# Patient Record
Sex: FEMALE | Race: WHITE | Employment: FULL TIME | URBAN - METROPOLITAN AREA
[De-identification: names, ages, dates, MRNs, and addresses within clinical notes are randomized per-mention and may not be internally consistent; named-entity substitution may affect disease eponyms.]

---

## 2017-02-23 ENCOUNTER — OFFICE VISIT (OUTPATIENT)
Dept: DERMATOLOGY | Facility: CLINIC | Age: 42
End: 2017-02-23

## 2017-02-23 DIAGNOSIS — L57.8 DIFFUSE PHOTODAMAGE OF SKIN: ICD-10-CM

## 2017-02-23 DIAGNOSIS — D22.9 MULTIPLE BENIGN NEVI: Primary | ICD-10-CM

## 2017-02-23 DIAGNOSIS — L81.4 SOLAR LENTIGINOSIS: ICD-10-CM

## 2017-02-23 ASSESSMENT — PAIN SCALES - GENERAL: PAINLEVEL: NO PAIN (0)

## 2017-02-23 NOTE — MR AVS SNAPSHOT
After Visit Summary   2/23/2017    [facility]    MRN: [id number]           Patient Information     Date Of Birth          1975        Visit Information        Provider Department      2/23/2017 8:30 AM Rachel Kimbrough MD [facility] Dermatology        Today's Diagnoses     Multiple benign nevi    -  1    Solar lentiginosis        Diffuse photodamage of skin           Follow-ups after your visit        Who to contact     Please call your clinic at [phone] to:    Ask questions about your health    Make or cancel appointments    Discuss your medicines    Learn about your test results    Speak to your doctor   If you have compliments or concerns about an experience at your clinic, or if you wish to file a complaint, please contact [facility] Patient Relations at [phone] or email us at [email or web address]         Additional Information About Your Visit        MyChart Information     [employer] gives you secure access to your electronic health record. If you see a primary care provider, you can also send messages to your care team and make appointments. If you have questions, please call your primary care clinic.  If you do not have a primary care provider, please call [phone] and they will assist you.      [employer] is an electronic gateway that provides easy, online access to your medical records. With [employer], you can request a clinic appointment, read your test results, renew a prescription or communicate with your care team.     To access your existing account, please contact your [facility] or call [phone] for assistance.        Care EveryWhere ID     This is your Care EveryWhere ID. This could be used by other organizations to access your [place] medical records  [id number]         Blood Pressure from Last 3 Encounters:   12/19/16 124/86   11/10/16 126/80   10/03/16 126/84    Weight from Last 3 Encounters:    12/19/16 64 kg (141 lb)   11/10/16 63.5 kg (140 lb)   10/03/16 64.9 kg (143 lb)              Today, you had the following     No orders found for display       Primary Care Provider Office Phone # Fax #    Belgica Vogel [phone]       [facility] [address] [facility] [address]        Thank you!     Thank you for choosing [facility]  for your care. Our goal is always to provide you with excellent care. Hearing back from our patients is one way we can continue to improve our services. Please take a few minutes to complete the written survey that you may receive in the mail after your visit with us. Thank you!             Your Updated Medication List - Protect others around you: Learn how to safely use, store and throw away your medicines at www.disposemymeds.org.          This list is accurate as of: 2/23/17 11:59 PM.  Always use your most recent med list.                   Brand Name Dispense Instructions for use    ALPRAZolam 0.25 MG tablet    XANAX    20 tablet    Take 1 tablet (0.25 mg) by mouth 3 times daily as needed for anxiety       escitalopram 10 MG tablet    LEXAPRO    90 tablet    Take 1 tablet (10 mg) by mouth daily       losartan 100 MG tablet    COZAAR    90 tablet    Take 1 tablet (100 mg) by mouth daily

## 2017-02-23 NOTE — LETTER
2/23/2017       RE: Dorie Armendariz  [address] [facility] [address]     Dear Colleague,    Thank you for referring your patient, Dorie Armendariz, to the [facility] DERMATOLOGY at [facility]. Please see a copy of my visit note below.            Pictures were placed in Pt's chart today for future reference.    [facility] Dermatology Note  Encounter Date: Feb 23, 2017    CC:   Chief Complaint   Patient presents with     Derm Problem     Dorie is here today for a full body mole check. She has two moles she would like checked out one her her upper back, and left breast.     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 Patient states that she has one on her upper back that other people have pointed out to her, and that she also has one on her left chest that has been there for years and seems to have slowly increased in size and become more of a triangular shape. She also has a mole on her right thigh that has been there since childhood, but has not changed. She otherwise denies any new, changing, or symptomatic lesions.        She is diligent about sun protection, and has just had a few blistering sun burns as a child. Never used tanning beds.      Past Medical History:   Past Medical History   Diagnosis Date     Anxiety      citalopram in the past, minimal xanax use      Hypertension goal BP (blood pressure) < 140/90      IBS (irritable bowel syndrome)      Migraine      Ovarian cyst      Uterine fibroid      Viral hepatitis 4/2013     Past Surgical History:  Past Surgical History   Procedure Laterality Date     Laparotomy exploratory  1988     ruptured appendix     Appendectomy open  1988     Laparoscopic hysterectomy total  6/2013     and ovarian cystectomy, ovaries intact     Social History:  The patient works as a  real estate researcher.     Family History:  Family History   Problem Relation Age of Onset     Hypertension Mother      Hypertension Father      Prostate Cancer Father      Lipids Father      Coronary Artery Disease Father 79     CAD, double bypass     Hypertension Brother      Hypertension Sister      Medications:  Current Outpatient Prescriptions   Medication Sig Dispense Refill     escitalopram (LEXAPRO) 10 MG tablet Take 1 tablet (10 mg) by mouth daily 90 tablet 1     losartan (COZAAR) 100 MG tablet Take 1 tablet (100 mg) by mouth daily 90 tablet 3     ALPRAZolam (XANAX) 0.25 MG tablet Take 1 tablet (0.25 mg) by mouth 3 times daily as needed for anxiety 20 tablet 0     Allergies:  Allergies   Allergen Reactions     Sulfa Drugs Other (See Comments)     Joint pain     Review of Systems:  Skin/Heme New Pt: The patient denies frequent sun exposure. The patient denies excessive scarring or problems healing except as per HPI. The patient denies excessive bleeding.  Constitutional: The patient denies fatigue, fevers, chills, unintended weight loss, and night sweats.    Physical exam:  - This is a well developed, well-nourished female in no acute distress, in a pleasant mood.    - Schwartz Skin Type I-II  - Full skin, which includes the head/face, both arms, chest, back, abdomen,both legs, genitalia and/or groin buttocks, digits and/or nails, was examined.  - Multiple regular brown and some pink pigmented macules and papules are identified on the trunk and extremities.  - Most prominent lesions on the right thigh (round measuring ~ 8mm) and left hip (oblong, measuring ~ 8mm).   - Scattered brown macules on sun exposed areas of the cheeks and nose.  - Pink papule on the right nasal dorsum without surrounding telangiectasias    Impression/Plan:  1. Multiple Clinically Benign Nevi: Few with congenital appearance as above on the right thigh and left hip.     Discussed with patient that all of these lesions are evenly  pigmented with regular borders. Discussed the ABCDEs of melanoma.     Recommended use of a broad spectrum sunscreen of at least SPF 30 on all sun exposed sites daily.  Apply 20 minutes prior to exposure and repeat application every two hours or after sweating or swimming.  Avoid any intentional indoor or outdoor tanning.      2. Fibrous Papule of the nose:     Discussed the benign nature of these lesions. No need for additional treatment.     3. Solar Lentigines:     Discussed that these are related to sun exposure and discussed sun protection as detailed above.     CC Belgica Kee MD  [facility]  [address] [place] [address] on close of this encounter.  Follow-up in 1 year, earlier for new or changing lesions.     Rachel Kimbrough MD  PGY-3 Internal Medicine/ Dermatology  [phone]    Carmen Onofre staffed the patient.    Staff Involved:  Resident(Rachel Kimbrough)/Staff(as above)      Patient was seen and examined with the medicine/dermatology resident. I agree with the history, review of systems, physical examination, assessments and plan.    Carmen Mckeon MD  Professor and  Chair  Department of Dermatology  [facility]

## 2017-02-23 NOTE — NURSING NOTE
Chief Complaint   Patient presents with     Derm Problem     Dorie is here today for a full body mole check. She has two moles she would like checked out one her her upper back, and left breast.     Maurice Burnette LPN

## 2017-02-23 NOTE — PROGRESS NOTES
[facility] Dermatology Note  Encounter Date: Feb 23, 2017    CC:   Chief Complaint   Patient presents with     Derm Problem     Dorie is here today for a full body mole check. She has two moles she would like checked out one her her upper back, and left breast.     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 Patient states that she has one on her upper back that other people have pointed out to her, and that she also has one on her left chest that has been there for years and seems to have slowly increased in size and become more of a triangular shape. She also has a mole on her right thigh that has been there since childhood, but has not changed. She otherwise denies any new, changing, or symptomatic lesions.        She is diligent about sun protection, and has just had a few blistering sun burns as a child. Never used tanning beds.      Past Medical History:   Past Medical History   Diagnosis Date     Anxiety      citalopram in the past, minimal xanax use      Hypertension goal BP (blood pressure) < 140/90      IBS (irritable bowel syndrome)      Migraine      Ovarian cyst      Uterine fibroid      Viral hepatitis 4/2013     Past Surgical History:  Past Surgical History   Procedure Laterality Date     Laparotomy exploratory  1988     ruptured appendix     Appendectomy open  1988     Laparoscopic hysterectomy total  6/2013     and ovarian cystectomy, ovaries intact     Social History:  The patient works as a real estate researcher.     Family History:  Family History   Problem Relation Age of Onset     Hypertension Mother      Hypertension Father      Prostate Cancer Father      Lipids Father      Coronary Artery Disease Father 79     CAD, double bypass     Hypertension Brother      Hypertension Sister      Medications:  Current Outpatient  Prescriptions   Medication Sig Dispense Refill     escitalopram (LEXAPRO) 10 MG tablet Take 1 tablet (10 mg) by mouth daily 90 tablet 1     losartan (COZAAR) 100 MG tablet Take 1 tablet (100 mg) by mouth daily 90 tablet 3     ALPRAZolam (XANAX) 0.25 MG tablet Take 1 tablet (0.25 mg) by mouth 3 times daily as needed for anxiety 20 tablet 0     Allergies:  Allergies   Allergen Reactions     Sulfa Drugs Other (See Comments)     Joint pain     Review of Systems:  Skin/Heme New Pt: The patient denies frequent sun exposure. The patient denies excessive scarring or problems healing except as per HPI. The patient denies excessive bleeding.  Constitutional: The patient denies fatigue, fevers, chills, unintended weight loss, and night sweats.    Physical exam:  - This is a well developed, well-nourished female in no acute distress, in a pleasant mood.    - Schwartz Skin Type I-II  - Full skin, which includes the head/face, both arms, chest, back, abdomen,both legs, genitalia and/or groin buttocks, digits and/or nails, was examined.  - Multiple regular brown and some pink pigmented macules and papules are identified on the trunk and extremities.  - Most prominent lesions on the right thigh (round measuring ~ 8mm) and left hip (oblong, measuring ~ 8mm).   - Scattered brown macules on sun exposed areas of the cheeks and nose.  - Pink papule on the right nasal dorsum without surrounding telangiectasias    Impression/Plan:  1. Multiple Clinically Benign Nevi: Few with congenital appearance as above on the right thigh and left hip.     Discussed with patient that all of these lesions are evenly pigmented with regular borders. Discussed the ABCDEs of melanoma.     Recommended use of a broad spectrum sunscreen of at least SPF 30 on all sun exposed sites daily.  Apply 20 minutes prior to exposure and repeat application every two hours or after sweating or swimming.  Avoid any intentional indoor or outdoor tanning.      2. Fibrous  Papule of the nose:     Discussed the benign nature of these lesions. No need for additional treatment.     3. Solar Lentigines:     Discussed that these are related to sun exposure and discussed sun protection as detailed above.     CC Belgica Kee MD  [facility]  [address] [place] [address] on close of this encounter.  Follow-up in 1 year, earlier for new or changing lesions.     Rachel Kimbrough MD  PGY-3 Internal Medicine/ Dermatology  [phone]    Carmen Onofre staffed the patient.    Staff Involved:  Resident(Rachel Kimbrough)/Staff(as above)      Patient was seen and examined with the medicine/dermatology resident. I agree with the history, review of systems, physical examination, assessments and plan.    Carmen Mckeon MD  Professor and  Chair  Department of Dermatology  [facility]

## 2017-03-12 PROBLEM — L57.8 DIFFUSE PHOTODAMAGE OF SKIN: Status: ACTIVE | Noted: 2017-03-12

## 2017-04-23 NOTE — PROGRESS NOTES
SUBJECTIVE:                                                    Dorie Armendariz is a 41 year old female who presents to clinic today for the following health issues:      Hypertension Follow-up      Outpatient blood pressures are not being checked.    Low Salt Diet: no added salt     Anxiety and Depression Follow-Up    Status since last visit: Improved     Other associated symptoms:None    Complicating factors:   Significant life event: No   Current substance abuse: None    Uses xanax rarely - #20 have lasted a year.    DEVYN-7 SCORE 11/10/2016 12/19/2016 4/24/2017   Total Score 10 0 1        PHQ-9 SCORE 11/10/2016 12/19/2016 4/24/2017   Total Score 12 1 1       GAD7         Amount of exercise or physical activity: 2-3 days/week for an average of 30-45 minutes    Problems taking medications regularly: No    Medication side effects: low libido on lexapro    Diet: low salt        Problem list and histories reviewed & adjusted, as indicated.    Additional history: as documented    Patient Active Problem List   Diagnosis     Hypertension goal BP (blood pressure) < 140/90     Anxiety     Major depression     Diffuse photodamage of skin     Past Surgical History:   Procedure Laterality Date     APPENDECTOMY OPEN  1988     LAPAROSCOPIC HYSTERECTOMY TOTAL  6/2013    and ovarian cystectomy, ovaries intact     LAPAROTOMY EXPLORATORY  1988    ruptured appendix       Social History   Substance Use Topics     Smoking status: Never Smoker     Smokeless tobacco: Never Used     Alcohol use Yes      Comment: social     Family History   Problem Relation Age of Onset     Hypertension Mother      Hypertension Father      Prostate Cancer Father      Lipids Father      Coronary Artery Disease Father 79     CAD, double bypass     Hypertension Brother      Hypertension Sister          BP Readings from Last 3 Encounters:   04/24/17 128/84   12/19/16 124/86   11/10/16 126/80    Wt Readings from Last 3 Encounters:   04/24/17 150 lb 8 oz (68.3 kg)  "  12/19/16 141 lb (64 kg)   11/10/16 140 lb (63.5 kg)                 Reviewed and updated as needed this visit by clinical staff  Tobacco  Allergies  Meds  Problems  Med Hx  Surg Hx  Fam Hx  Soc Hx        Reviewed and updated as needed this visit by Provider  Allergies  Meds  Problems         ROS:  CONST: POSITIVE for night sweats, NEGATIVE for fevers  RESP:  NEGATIVE for shortness of breath or cough  CV:  NEGATIVE for chest pain    GI: NEGATIVE for abdominal pain   :  NEGATIVE for vaginal dryness      OBJECTIVE:                                                    /84 (BP Location: Right arm, Patient Position: Chair, Cuff Size: Adult Regular)  Pulse 94  Temp 99.1  F (37.3  C) (Oral)  Resp 16  Ht 5' 5\" (1.651 m)  Wt 150 lb 8 oz (68.3 kg)  SpO2 98%  BMI 25.04 kg/m2  Body mass index is 25.04 kg/(m^2).  GEN:  no apparent distress  LUNGS:  normal respiratory effort, and lungs clear to auscultation bilaterally - no rales, rhonchi or wheezes  CV: regular rate and rhythm, normal S1 S2, no S3 or S4 and no murmur, click or rub   PSYCH:    Appearance: appropriately and casually dressed    Attitude: cooperative    Speech:  normal rate, rhythm, and tone    Thought Form: organized    Thought Content: no evidence of psychotic thought    Mood: better    Affect: mood congruent    Insight: good         ASSESSMENT/PLAN:                                                        1. Essential hypertension with goal blood pressure less than 140/90  stable/well controlled   - losartan (COZAAR) 100 MG tablet; Take 1 tablet (100 mg) by mouth daily  Dispense: 90 tablet; Refill: 3  - Basic metabolic panel    2. Anxiety  stable/well controlled   - escitalopram (LEXAPRO) 10 MG tablet; Take 1 tablet (10 mg) by mouth daily  Dispense: 90 tablet; Refill: 3  - ALPRAZolam (XANAX) 0.25 MG tablet; Take 1 tablet (0.25 mg) by mouth 3 times daily as needed for anxiety  Dispense: 20 tablet; Refill: 0    3. Major depressive disorder " with single episode, in full remission (H)  stable/well controlled but with SSRI side effect of decreased libido.  Suggested trial of lowering lexapro dose this summer if mood still well-controlled.  Then maintain 5 mg daily dose through winter (or increase back to 10 mg daily dose during winter).  Consider discontinuing SSRI early next sprin.  - escitalopram (LEXAPRO) 10 MG tablet; Take 1 tablet (10 mg) by mouth daily  Dispense: 90 tablet; Refill: 3      FUTURE APPOINTMENTS:       - Follow-up visit in 1 year.    Patient Instructions   If you are still feeling good in mid-summer (July) go ahead and try cutting the lexapro dose down to 5 mg (1/2 tab) daily and see how that goes.      Belgica Vogel MD  [facility]

## 2017-04-24 ENCOUNTER — OFFICE VISIT (OUTPATIENT)
Dept: FAMILY MEDICINE | Facility: CLINIC | Age: 42
End: 2017-04-24
Payer: COMMERCIAL

## 2017-04-24 VITALS
SYSTOLIC BLOOD PRESSURE: 128 MMHG | WEIGHT: 150.5 LBS | TEMPERATURE: 99.1 F | BODY MASS INDEX: 25.08 KG/M2 | RESPIRATION RATE: 16 BRPM | DIASTOLIC BLOOD PRESSURE: 84 MMHG | OXYGEN SATURATION: 98 % | HEART RATE: 94 BPM | HEIGHT: 65 IN

## 2017-04-24 DIAGNOSIS — F41.9 ANXIETY: ICD-10-CM

## 2017-04-24 DIAGNOSIS — I10 ESSENTIAL HYPERTENSION WITH GOAL BLOOD PRESSURE LESS THAN 140/90: ICD-10-CM

## 2017-04-24 DIAGNOSIS — F32.5 MAJOR DEPRESSIVE DISORDER WITH SINGLE EPISODE, IN FULL REMISSION (H): Primary | ICD-10-CM

## 2017-04-24 LAB
ANION GAP SERPL CALCULATED.3IONS-SCNC: 10 MMOL/L (ref 3–14)
BUN SERPL-MCNC: 14 MG/DL (ref 7–30)
CALCIUM SERPL-MCNC: 9.1 MG/DL (ref 8.5–10.1)
CHLORIDE SERPL-SCNC: 106 MMOL/L (ref 94–109)
CO2 SERPL-SCNC: 25 MMOL/L (ref 20–32)
CREAT SERPL-MCNC: 0.74 MG/DL (ref 0.52–1.04)
GFR SERPL CREATININE-BSD FRML MDRD: 87 ML/MIN/1.7M2
GLUCOSE SERPL-MCNC: 87 MG/DL (ref 70–99)
POTASSIUM SERPL-SCNC: 3.9 MMOL/L (ref 3.4–5.3)
SODIUM SERPL-SCNC: 141 MMOL/L (ref 133–144)

## 2017-04-24 PROCEDURE — 36415 COLL VENOUS BLD VENIPUNCTURE: CPT | Performed by: FAMILY MEDICINE

## 2017-04-24 PROCEDURE — 80048 BASIC METABOLIC PNL TOTAL CA: CPT | Performed by: FAMILY MEDICINE

## 2017-04-24 PROCEDURE — 99214 OFFICE O/P EST MOD 30 MIN: CPT | Performed by: FAMILY MEDICINE

## 2017-04-24 RX ORDER — ALPRAZOLAM 0.25 MG
0.25 TABLET ORAL 3 TIMES DAILY PRN
Qty: 20 TABLET | Refills: 0 | Status: SHIPPED | OUTPATIENT
Start: 2017-04-24 | End: 2018-01-29

## 2017-04-24 RX ORDER — ESCITALOPRAM OXALATE 10 MG/1
10 TABLET ORAL DAILY
Qty: 90 TABLET | Refills: 3 | Status: SHIPPED | OUTPATIENT
Start: 2017-04-24 | End: 2017-10-23

## 2017-04-24 RX ORDER — LOSARTAN POTASSIUM 100 MG/1
100 TABLET ORAL DAILY
Qty: 90 TABLET | Refills: 3 | Status: SHIPPED | OUTPATIENT
Start: 2017-04-24 | End: 2018-01-29

## 2017-04-24 ASSESSMENT — ANXIETY QUESTIONNAIRES
1. FEELING NERVOUS, ANXIOUS, OR ON EDGE: SEVERAL DAYS
5. BEING SO RESTLESS THAT IT IS HARD TO SIT STILL: NOT AT ALL
3. WORRYING TOO MUCH ABOUT DIFFERENT THINGS: NOT AT ALL
GAD7 TOTAL SCORE: 1
6. BECOMING EASILY ANNOYED OR IRRITABLE: NOT AT ALL
7. FEELING AFRAID AS IF SOMETHING AWFUL MIGHT HAPPEN: NOT AT ALL
2. NOT BEING ABLE TO STOP OR CONTROL WORRYING: NOT AT ALL

## 2017-04-24 ASSESSMENT — PATIENT HEALTH QUESTIONNAIRE - PHQ9: 5. POOR APPETITE OR OVEREATING: NOT AT ALL

## 2017-04-24 NOTE — PATIENT INSTRUCTIONS
If you are still feeling good in mid-summer (July) go ahead and try cutting the lexapro dose down to 5 mg (1/2 tab) daily and see how that goes.

## 2017-04-24 NOTE — LETTER
My Depression Action Plan  Name: Dorie Armendariz   Date of Birth 1975  Date: 4/24/2017    My doctor: Belgica Vogel   My clinic: [address] [address] [phone]          GREEN    ZONE   Good Control    What it looks like:     Things are going generally well. You have normal up s and down s. You may even feel depressed from time to time, but bad moods usually last less than a day.   What you need to do:  1. Continue to care for yourself (see self care plan)  2. Check your depression survival kit and update it as needed  3. Follow your physician s recommendations including any medication.  4. Do not stop taking medication unless you consult with your physician first.           YELLOW         ZONE Getting Worse    What it looks like:     Depression is starting to interfere with your life.     It may be hard to get out of bed; you may be starting to isolate yourself from others.    Symptoms of depression are starting to last most all day and this has happened for several days.     You may have suicidal thoughts but they are not constant.   What you need to do:     1. Call your care team, your response to treatment will improve if you keep your care team informed of your progress. Yellow periods are signs an adjustment may need to be made.     2. Continue your self-care, even if you have to fake it!    3. Talk to someone in your support network    4. Open up your depression survival kit           RED    ZONE Medical Alert - Get Help    What it looks like:     Depression is seriously interfering with your life.     You may experience these or other symptoms: You can t get out of bed most days, can t work or engage in other necessary activities, you have trouble taking care of basic hygiene, or basic responsibilities, thoughts of suicide or death that will not go away, self-injurious behavior.     What you need to do:  1. Call your care team and  request a same-day appointment. If they are not available (weekends or after hours) call your local crisis line, emergency room or 911.      Electronically signed by: Karolina Rondon, April 24, 2017    Depression Self Care Plan / Survival Kit    Self-Care for Depression  Here s the deal. Your body and mind are really not as separate as most people think.  What you do and think affects how you feel and how you feel influences what you do and think. This means if you do things that people who feel good do, it will help you feel better.  Sometimes this is all it takes.  There is also a place for medication and therapy depending on how severe your depression is, so be sure to consult with your medical provider and/ or Behavioral Health Consultant if your symptoms are worsening or not improving.     In order to better manage my stress, I will:    Exercise  Get some form of exercise, every day. This will help reduce pain and release endorphins, the  feel good  chemicals in your brain. This is almost as good as taking antidepressants!  This is not the same as joining a gym and then never going! (they count on that by the way ) It can be as simple as just going for a walk or doing some gardening, anything that will get you moving.      Hygiene   Maintain good hygiene (Get out of bed in the morning, Make your bed, Brush your teeth, Take a shower, and Get dressed like you were going to work, even if you are unemployed).  If your clothes don't fit try to get ones that do.    Diet  I will strive to eat foods that are good for me, drink plenty of water, and avoid excessive sugar, caffeine, alcohol, and other mood-altering substances.  Some foods that are helpful in depression are: complex carbohydrates, B vitamins, flaxseed, fish or fish oil, fresh fruits and vegetables.    Psychotherapy  I agree to participate in Individual Therapy (if recommended).    Medication  If prescribed medications, I agree to take them.  Missing doses  can result in serious side effects.  I understand that drinking alcohol, or other illicit drug use, may cause potential side effects.  I will not stop my medication abruptly without first discussing it with my provider.    Staying Connected With Others  I will stay in touch with my friends, family members, and my primary care provider/team.    Use your imagination  Be creative.  We all have a creative side; it doesn t matter if it s oil painting, sand castles, or mud pies! This will also kick up the endorphins.    Witness Beauty  (AKA stop and smell the roses) Take a look outside, even in mid-winter. Notice colors, textures. Watch the squirrels and birds.     Service to others  Be of service to others.  There is always someone else in need.  By helping others we can  get out of ourselves  and remember the really important things.  This also provides opportunities for practicing all the other parts of the program.    Humor  Laugh and be silly!  Adjust your TV habits for less news and crime-drama and more comedy.    Control your stress  Try breathing deep, massage therapy, biofeedback, and meditation. Find time to relax each day.     My support system    Clinic Contact:  Phone number:    Contact 1:  Phone number:    Contact 2:  Phone number:    Shinto/:  Phone number:    Therapist:  Phone number:    Local crisis center:    Phone number:    Other community support:  Phone number:

## 2017-04-24 NOTE — MR AVS SNAPSHOT
After Visit Summary   4/24/2017    Dorie McCartan    MRN: [id number]           Patient Information     Date Of Birth          1975        Visit Information        Provider Department      4/24/2017 8:40 AM Belgica Vogel MD [facility]        Today's Diagnoses     Essential hypertension with goal blood pressure less than 140/90        Anxiety          Care Instructions    If you are still feeling good in mid-summer (July) go ahead and try cutting the lexapro dose down to 5 mg (1/2 tab) daily and see how that goes.        Follow-ups after your visit        Who to contact     If you have questions or need follow up information about today's clinic visit or your schedule please contact [facility] directly at [phone].  Normal or non-critical lab and imaging results will be communicated to you by MyChart, letter or phone within 4 business days after the clinic has received the results. If you do not hear from us within 7 days, please contact the clinic through [employer] or phone. If you have a critical or abnormal lab result, we will notify you by phone as soon as possible.  Submit refill requests through MyFit or call your pharmacy and they will forward the refill request to us. Please allow 3 business days for your refill to be completed.          Additional Information About Your Visit        MyChart Information     MyFit gives you secure access to your electronic health record. If you see a primary care provider, you can also send messages to your care team and make appointments. If you have questions, please call your primary care clinic.  If you do not have a primary care provider, please call [phone] and they will assist you.        Care EveryWhere ID     This is your Care EveryWhere ID. This could be used by other organizations to access your [facility] medical records  [id number]        Your Vitals Were     Pulse Temperature Respirations Height Pulse  "Oximetry BMI (Body Mass Index)    94 99.1  F (37.3  C) (Oral) 16 5' 5\" (1.651 m) 98% 25.04 kg/m2       Blood Pressure from Last 3 Encounters:   04/24/17 128/84   12/19/16 124/86   11/10/16 126/80    Weight from Last 3 Encounters:   04/24/17 150 lb 8 oz (68.3 kg)   12/19/16 141 lb (64 kg)   11/10/16 140 lb (63.5 kg)              We Performed the Following     Basic metabolic panel     DEPRESSION ACTION PLAN (DAP)          Where to get your medicines      These medications were sent to [facility] [id number] [place] [address] AT [address] [address]     Phone:  [phone]     escitalopram 10 MG tablet    losartan 100 MG tablet         Some of these will need a paper prescription and others can be bought over the counter.  Ask your nurse if you have questions.     Bring a paper prescription for each of these medications     ALPRAZolam 0.25 MG tablet          Primary Care Provider Office Phone # Fax #    Belgica Vogel [phone]       [facility] [address]  [facility] [address]        Thank you!     Thank you for choosing [facility]  for your care. Our goal is always to provide you with excellent care. Hearing back from our patients is one way we can continue to improve our services. Please take a few minutes to complete the written survey that you may receive in the mail after your visit with us. Thank you!             Your Updated Medication List - Protect others around you: Learn how to safely use, store and throw away your medicines at www.disposemymeds.org.          This list is accurate as of: 4/24/17  8:59 AM.  Always use your most recent med list.                   Brand Name Dispense Instructions for use    ALPRAZolam 0.25 MG tablet    XANAX    20 tablet    Take 1 tablet (0.25 mg) by mouth 3 times daily as needed for anxiety       escitalopram 10 MG tablet    LEXAPRO    90 tablet    Take 1 tablet (10 " mg) by mouth daily       losartan 100 MG tablet    COZAAR    90 tablet    Take 1 tablet (100 mg) by mouth daily

## 2017-04-24 NOTE — NURSING NOTE
"Chief Complaint   Patient presents with     Anxiety     Hypertension       Initial /84 (BP Location: Right arm, Patient Position: Chair, Cuff Size: Adult Regular)  Pulse 94  Temp 99.1  F (37.3  C) (Oral)  Resp 16  Ht 5' 5\" (1.651 m)  Wt 150 lb 8 oz (68.3 kg)  SpO2 98%  BMI 25.04 kg/m2 Estimated body mass index is 25.04 kg/(m^2) as calculated from the following:    Height as of this encounter: 5' 5\" (1.651 m).    Weight as of this encounter: 150 lb 8 oz (68.3 kg).  Medication Reconciliation: complete     Karolina Rondon, CHERELLE    "

## 2017-04-25 ASSESSMENT — PATIENT HEALTH QUESTIONNAIRE - PHQ9: SUM OF ALL RESPONSES TO PHQ QUESTIONS 1-9: 1

## 2017-04-25 ASSESSMENT — ANXIETY QUESTIONNAIRES: GAD7 TOTAL SCORE: 1

## 2017-10-23 DIAGNOSIS — F41.9 ANXIETY: ICD-10-CM

## 2017-10-25 RX ORDER — ESCITALOPRAM OXALATE 10 MG/1
TABLET ORAL
Qty: 90 TABLET | Refills: 1 | Status: SHIPPED | OUTPATIENT
Start: 2017-10-25 | End: 2018-01-29

## 2017-10-25 NOTE — TELEPHONE ENCOUNTER
Requesting pharmacy is from [facility] in [place], [place].    Original script was sent to a local [facility].    Team coordinators-Please clarify with patient where she wants Escitalopram (Lexparo) script sent.    Thank you!  TODD Blair, RN        PHQ-9 SCORE 11/10/2016 12/19/2016 4/24/2017   Total Score 12 1 1

## 2017-10-25 NOTE — TELEPHONE ENCOUNTER
Prescription approved per [facility] Refill Protocol.  FRANKLIN Lechuga, BSN, RN

## 2017-12-29 ENCOUNTER — OFFICE VISIT (OUTPATIENT)
Dept: PEDIATRICS | Facility: CLINIC | Age: 42
End: 2017-12-29
Payer: COMMERCIAL

## 2017-12-29 VITALS
BODY MASS INDEX: 26.09 KG/M2 | DIASTOLIC BLOOD PRESSURE: 76 MMHG | RESPIRATION RATE: 16 BRPM | HEART RATE: 90 BPM | WEIGHT: 156.6 LBS | HEIGHT: 65 IN | SYSTOLIC BLOOD PRESSURE: 126 MMHG | OXYGEN SATURATION: 100 % | TEMPERATURE: 98.5 F

## 2017-12-29 DIAGNOSIS — J01.90 ACUTE SINUSITIS WITH SYMPTOMS > 10 DAYS: Primary | ICD-10-CM

## 2017-12-29 PROCEDURE — 99213 OFFICE O/P EST LOW 20 MIN: CPT | Performed by: PHYSICIAN ASSISTANT

## 2017-12-29 NOTE — PATIENT INSTRUCTIONS
With distilled water 2-3x per day for a minimum 2-3 days  Zyrtec twice daily for 4-5 days, then daily  Flonase as prescribed    Augmentin if symptoms worsen in the next 2-3 days  Culturelle for 30 days  Yogurt with medicine    Acute Bacterial Rhinosinusitis (ABRS)    Acute bacterial rhinosinusitis (ABRS) is an infection of your nasal cavity and sinuses. It s caused by bacteria. Acute means that you ve had symptoms for less than 4 weeks, but possibly up to 12 weeks.  Understanding your sinuses  The nasal cavity is the large air-filled space behind your nose. The sinuses are a group of spaces formed by the bones of your face. They connect with your nasal cavity. ABRS causes the tissue lining these spaces to become inflamed. Mucus may not drain normally. This leads to facial pain and other symptoms.  What causes ABRS?  ABRS most often follows an upper respiratory infection caused by a virus. Bacteria then infect the lining of your nasal cavity and sinuses. But you can also get ABRS if you have:    Nasal allergies    Long-term nasal swelling and congestion not caused by allergies    Blockage in the nose  Symptoms of ABRS  The symptoms of ABRS may be different for each person and include:    Nasal congestion or blockage    Pain or pressure in the face    Thick, colored drainage from the nose  Other symptoms may include:    Runny nose    Fluid draining from the nose down the throat (postnasal drip)    Headache    Cough    Pain    Fever  Diagnosing ABRS  ABRS may be diagnosed if you ve had an upper respiratory infection like a cold and cough for 10 or more days without improvement or with worsening symptoms. Your healthcare provider will ask about your symptoms and your medical history. The provider will check your vital signs, including your temperature. You ll have a physical exam. The healthcare provider will check your ears, nose, and throat. You likely won t need any tests. If ABRS comes back, you may have a  culture or other tests.  Treatment for ABRS  Treatment may include:    Antibiotic medicine. This is for symptoms that last for at least 10 to 14 days.    Nasal corticosteroid medicine. Drops or spray used in the nose can lessen swelling and congestion.    Over-the-counter pain medicine. This is to lessen sinus pain and pressure.    Nasal decongestant medicine. Spray or drops may help to lessen congestion. Do not use them for more than a few days.    Salt wash (saline irrigation). This can help to loosen mucus.  Possible complications of ABRS  ABRS may come back or become long-term (chronic). In rare cases, ABRS may cause complications such as:     Inflamed tissue around the brain and spinal cord (meningitis)    Inflamed tissue around the eyes (orbital cellulitis)    Inflamed bones around the sinuses (osteitis)  These problems may need to be treated in a hospital with intravenous (IV) antibiotic medicine or surgery.  When to call the healthcare provider  Call your healthcare provider if you have any of the following:    Symptoms that don t get better, or get worse    Symptoms that don t get better after 3 to 5 days on antibiotics    Trouble seeing    Swelling around your eyes    Confusion or trouble staying awake   Date Last Reviewed: 5/1/2017 2000-2017 [employer]. [address] [place], [place] [id number]. All rights reserved. This information is not intended as a substitute for professional medical care. Always follow your healthcare professional's instructions.        Self-Care for Sinusitis     Drinking plenty of water can help sinuses drain.   Sinusitis can often be managed with self-care. Self-care can keep sinuses moist and make you feel more comfortable. Remember to follow your doctor's instructions closely. This can make a big difference in getting your sinus problem under control.  Drink fluids  Drinking extra fluids helps thin your mucus. This lets it drain from your sinuses more easily.  Have a glass of water every hour or two. A humidifier helps in much the same way. Fluids can also offset the drying effects of certain medicines. If you use a humidifier, follow the product maker's instructions on how to use it. Clean it on a regular schedule.  Use saltwater rinses  Rinses help keep your sinuses and nose moist. Mix a teaspoon of salt in 8 ounces of fresh, warm water. Use a bulb syringe to gently squirt the water into your nose a few times a day. You can also buy ready-made saline nasal sprays.  Apply hot or cold packs  Applying heat to the area surrounding your sinuses may make you feel more comfortable. Use a hot water bottle or a hand towel dipped in hot water. Some people also find ice packs effective for relieving pain.  Medicines  Your doctor may prescribe medications to help treat your sinusitis. If you have an infection, antibiotics can help clear it up. If you are prescribed antibiotics, take all pills on schedule until they are gone, even if you feel better. Decongestants help relieve swelling. Use decongestant sprays for short periods only under the direction of your doctor. If you have allergies, your doctor may prescribe medications to help relieve them.   Date Last Reviewed: 10/1/2016    8453-9619 [employer]. [address] [place] [address], [place] [address]. All rights reserved. This information is not intended as a substitute for professional medical care. Always follow your healthcare professional's instructions.

## 2017-12-29 NOTE — PROGRESS NOTES
"  SUBJECTIVE:   Dorie Armendariz is a 42 year old female who presents to clinic today for the following health issues:      Acute Illness   Acute illness concerns: sinus   Onset: Ongoing since 1 year    Fever: no     Chills/Sweats: no     Headache (location?): YES    Sinus Pressure:YES    Conjunctivitis:  YES: both and mild     Ear Pain: no    Rhinorrhea: YES    Congestion: YES- nasal    Sore Throat: no      Cough: no    Wheeze: no     Decreased Appetite: no     Nausea: no     Vomiting: no     Diarrhea:  no     Dysuria/Freq.: no     Fatigue/Achiness: no     Sick/Strep Exposure: YES- partner is sick     Therapies Tried and outcome: Aleve Cold and Sinus, Sudafed and Allegra, Notified Monday Dec 18 th of black mold in house      ROS:  10 point ROS negative except as listed above      OBJECTIVE:     /76  Pulse 90  Temp 98.5  F (36.9  C) (Oral)  Resp 16  Ht 5' 5\" (1.651 m)  Wt 156 lb 9.6 oz (71 kg)  SpO2 100%  BMI 26.06 kg/m2  Body mass index is 26.06 kg/(m^2).  GENERAL: healthy, alert and no distress  EYES: Eyes grossly normal to inspection  HENT: ear canals normal, TMs bulging, nose and mouth without ulcers or lesions, no focal sinus tenderness  NECK: no adenopathy  RESP: lungs clear to auscultation - no rales, rhonchi or wheezes  CV: regular rate and rhythm    Diagnostic Test Results:  none     ASSESSMENT/PLAN:     (J01.90) Acute sinusitis with symptoms > 10 days  (primary encounter diagnosis)  Comment: Likely allergic, however patient is concerned and fits criteria for ABRS.  Patient will trial and continue allergic remedies.  May begin ABRS treatment if symptoms worsen  Plan: amoxicillin-clavulanate (AUGMENTIN) 875-125 MG         per tablet - USE TBD based on symptoms over the next few days    Patient Instructions         With distilled water 2-3x per day for a minimum 2-3 days  Zyrtec twice daily for 4-5 days, then daily  Flonase as prescribed    Augmentin if symptoms worsen in the next 2-3 " days  Culturelle for 30 days  Yogurt with medicine    Acute Bacterial Rhinosinusitis (ABRS)    Acute bacterial rhinosinusitis (ABRS) is an infection of your nasal cavity and sinuses. It s caused by bacteria. Acute means that you ve had symptoms for less than 4 weeks, but possibly up to 12 weeks.  Understanding your sinuses  The nasal cavity is the large air-filled space behind your nose. The sinuses are a group of spaces formed by the bones of your face. They connect with your nasal cavity. ABRS causes the tissue lining these spaces to become inflamed. Mucus may not drain normally. This leads to facial pain and other symptoms.  What causes ABRS?  ABRS most often follows an upper respiratory infection caused by a virus. Bacteria then infect the lining of your nasal cavity and sinuses. But you can also get ABRS if you have:    Nasal allergies    Long-term nasal swelling and congestion not caused by allergies    Blockage in the nose  Symptoms of ABRS  The symptoms of ABRS may be different for each person and include:    Nasal congestion or blockage    Pain or pressure in the face    Thick, colored drainage from the nose  Other symptoms may include:    Runny nose    Fluid draining from the nose down the throat (postnasal drip)    Headache    Cough    Pain    Fever  Diagnosing ABRS  ABRS may be diagnosed if you ve had an upper respiratory infection like a cold and cough for 10 or more days without improvement or with worsening symptoms. Your healthcare provider will ask about your symptoms and your medical history. The provider will check your vital signs, including your temperature. You ll have a physical exam. The healthcare provider will check your ears, nose, and throat. You likely won t need any tests. If ABRS comes back, you may have a culture or other tests.  Treatment for ABRS  Treatment may include:    Antibiotic medicine. This is for symptoms that last for at least 10 to 14 days.    Nasal corticosteroid  medicine. Drops or spray used in the nose can lessen swelling and congestion.    Over-the-counter pain medicine. This is to lessen sinus pain and pressure.    Nasal decongestant medicine. Spray or drops may help to lessen congestion. Do not use them for more than a few days.    Salt wash (saline irrigation). This can help to loosen mucus.  Possible complications of ABRS  ABRS may come back or become long-term (chronic). In rare cases, ABRS may cause complications such as:     Inflamed tissue around the brain and spinal cord (meningitis)    Inflamed tissue around the eyes (orbital cellulitis)    Inflamed bones around the sinuses (osteitis)  These problems may need to be treated in a hospital with intravenous (IV) antibiotic medicine or surgery.  When to call the healthcare provider  Call your healthcare provider if you have any of the following:    Symptoms that don t get better, or get worse    Symptoms that don t get better after 3 to 5 days on antibiotics    Trouble seeing    Swelling around your eyes    Confusion or trouble staying awake   Date Last Reviewed: 5/1/2017 2000-2017 [employer]. [address] [place] [address]. All rights reserved. This information is not intended as a substitute for professional medical care. Always follow your healthcare professional's instructions.        Self-Care for Sinusitis     Drinking plenty of water can help sinuses drain.   Sinusitis can often be managed with self-care. Self-care can keep sinuses moist and make you feel more comfortable. Remember to follow your doctor's instructions closely. This can make a big difference in getting your sinus problem under control.  Drink fluids  Drinking extra fluids helps thin your mucus. This lets it drain from your sinuses more easily. Have a glass of water every hour or two. A humidifier helps in much the same way. Fluids can also offset the drying effects of certain medicines. If you use a humidifier,  follow the product maker's instructions on how to use it. Clean it on a regular schedule.  Use saltwater rinses  Rinses help keep your sinuses and nose moist. Mix a teaspoon of salt in 8 ounces of fresh, warm water. Use a bulb syringe to gently squirt the water into your nose a few times a day. You can also buy ready-made saline nasal sprays.  Apply hot or cold packs  Applying heat to the area surrounding your sinuses may make you feel more comfortable. Use a hot water bottle or a hand towel dipped in hot water. Some people also find ice packs effective for relieving pain.  Medicines  Your doctor may prescribe medications to help treat your sinusitis. If you have an infection, antibiotics can help clear it up. If you are prescribed antibiotics, take all pills on schedule until they are gone, even if you feel better. Decongestants help relieve swelling. Use decongestant sprays for short periods only under the direction of your doctor. If you have allergies, your doctor may prescribe medications to help relieve them.   Date Last Reviewed: 10/1/2016    4216-0071 [employer]. [address] [place] [address]. All rights reserved. This information is not intended as a substitute for professional medical care. Always follow your healthcare professional's instructions.                Marvin Casarez PA-C  [facility]

## 2017-12-29 NOTE — NURSING NOTE
"Chief Complaint   Patient presents with     Sinus Problem       Initial /76  Pulse 90  Temp 98.5  F (36.9  C) (Oral)  Resp 16  Ht 5' 5\" (1.651 m)  Wt 156 lb 9.6 oz (71 kg)  SpO2 100%  BMI 26.06 kg/m2 Estimated body mass index is 26.06 kg/(m^2) as calculated from the following:    Height as of this encounter: 5' 5\" (1.651 m).    Weight as of this encounter: 156 lb 9.6 oz (71 kg).  Medication Reconciliation: complete   Nila Bahena MA      "

## 2017-12-29 NOTE — MR AVS SNAPSHOT
After Visit Summary   12/29/2017    Dorie Armendariz    MRN: [id number]           Patient Information     Date Of Birth          1975        Visit Information        Provider Department      12/29/2017 10:00 AM Marvin Casarez PA-C [facility]        Today's Diagnoses     Acute sinusitis with symptoms > 10 days    -  1      Care Instructions        With distilled water 2-3x per day for a minimum 2-3 days  Zyrtec twice daily for 4-5 days, then daily  Flonase as prescribed    Augmentin if symptoms worsen in the next 2-3 days  Culturelle for 30 days  Yogurt with medicine    Acute Bacterial Rhinosinusitis (ABRS)    Acute bacterial rhinosinusitis (ABRS) is an infection of your nasal cavity and sinuses. It s caused by bacteria. Acute means that you ve had symptoms for less than 4 weeks, but possibly up to 12 weeks.  Understanding your sinuses  The nasal cavity is the large air-filled space behind your nose. The sinuses are a group of spaces formed by the bones of your face. They connect with your nasal cavity. ABRS causes the tissue lining these spaces to become inflamed. Mucus may not drain normally. This leads to facial pain and other symptoms.  What causes ABRS?  ABRS most often follows an upper respiratory infection caused by a virus. Bacteria then infect the lining of your nasal cavity and sinuses. But you can also get ABRS if you have:    Nasal allergies    Long-term nasal swelling and congestion not caused by allergies    Blockage in the nose  Symptoms of ABRS  The symptoms of ABRS may be different for each person and include:    Nasal congestion or blockage    Pain or pressure in the face    Thick, colored drainage from the nose  Other symptoms may include:    Runny nose    Fluid draining from the nose down the throat (postnasal drip)    Headache    Cough    Pain    Fever  Diagnosing ABRS  ABRS may be diagnosed if you ve had an upper respiratory infection like a cold and cough  for 10 or more days without improvement or with worsening symptoms. Your healthcare provider will ask about your symptoms and your medical history. The provider will check your vital signs, including your temperature. You ll have a physical exam. The healthcare provider will check your ears, nose, and throat. You likely won t need any tests. If ABRS comes back, you may have a culture or other tests.  Treatment for ABRS  Treatment may include:    Antibiotic medicine. This is for symptoms that last for at least 10 to 14 days.    Nasal corticosteroid medicine. Drops or spray used in the nose can lessen swelling and congestion.    Over-the-counter pain medicine. This is to lessen sinus pain and pressure.    Nasal decongestant medicine. Spray or drops may help to lessen congestion. Do not use them for more than a few days.    Salt wash (saline irrigation). This can help to loosen mucus.  Possible complications of ABRS  ABRS may come back or become long-term (chronic). In rare cases, ABRS may cause complications such as:     Inflamed tissue around the brain and spinal cord (meningitis)    Inflamed tissue around the eyes (orbital cellulitis)    Inflamed bones around the sinuses (osteitis)  These problems may need to be treated in a hospital with intravenous (IV) antibiotic medicine or surgery.  When to call the healthcare provider  Call your healthcare provider if you have any of the following:    Symptoms that don t get better, or get worse    Symptoms that don t get better after 3 to 5 days on antibiotics    Trouble seeing    Swelling around your eyes    Confusion or trouble staying awake   Date Last Reviewed: 5/1/2017 2000-2017 [employer]. [address] [place] [address], [place] [address]. All rights reserved. This information is not intended as a substitute for professional medical care. Always follow your healthcare professional's instructions.        Self-Care for Sinusitis     Drinking plenty of water can  help sinuses drain.   Sinusitis can often be managed with self-care. Self-care can keep sinuses moist and make you feel more comfortable. Remember to follow your doctor's instructions closely. This can make a big difference in getting your sinus problem under control.  Drink fluids  Drinking extra fluids helps thin your mucus. This lets it drain from your sinuses more easily. Have a glass of water every hour or two. A humidifier helps in much the same way. Fluids can also offset the drying effects of certain medicines. If you use a humidifier, follow the product maker's instructions on how to use it. Clean it on a regular schedule.  Use saltwater rinses  Rinses help keep your sinuses and nose moist. Mix a teaspoon of salt in 8 ounces of fresh, warm water. Use a bulb syringe to gently squirt the water into your nose a few times a day. You can also buy ready-made saline nasal sprays.  Apply hot or cold packs  Applying heat to the area surrounding your sinuses may make you feel more comfortable. Use a hot water bottle or a hand towel dipped in hot water. Some people also find ice packs effective for relieving pain.  Medicines  Your doctor may prescribe medications to help treat your sinusitis. If you have an infection, antibiotics can help clear it up. If you are prescribed antibiotics, take all pills on schedule until they are gone, even if you feel better. Decongestants help relieve swelling. Use decongestant sprays for short periods only under the direction of your doctor. If you have allergies, your doctor may prescribe medications to help relieve them.   Date Last Reviewed: 10/1/2016    7226-5025 [employer]. [address] [place] [address], [place] [address]. All rights reserved. This information is not intended as a substitute for professional medical care. Always follow your healthcare professional's instructions.                Follow-ups after your visit        Who to contact     If you have questions or  "need follow up information about today's clinic visit or your schedule please contact [facility] directly at [phone].  Normal or non-critical lab and imaging results will be communicated to you by LP Aminahart, letter or phone within 4 business days after the clinic has received the results. If you do not hear from us within 7 days, please contact the clinic through Motribet or phone. If you have a critical or abnormal lab result, we will notify you by phone as soon as possible.  Submit refill requests through Smart Gardener or call your pharmacy and they will forward the refill request to us. Please allow 3 business days for your refill to be completed.          Additional Information About Your Visit        Smart Gardener Information     Smart Gardener gives you secure access to your electronic health record. If you see a primary care provider, you can also send messages to your care team and make appointments. If you have questions, please call your primary care clinic.  If you do not have a primary care provider, please call [phone] and they will assist you.        Care EveryWhere ID     This is your Care EveryWhere ID. This could be used by other organizations to access your [facility] medical records  [id number]        Your Vitals Were     Pulse Temperature Respirations Height Pulse Oximetry BMI (Body Mass Index)    90 98.5  F (36.9  C) (Oral) 16 5' 5\" (1.651 m) 100% 26.06 kg/m2       Blood Pressure from Last 3 Encounters:   12/29/17 126/76   04/24/17 128/84   12/19/16 124/86    Weight from Last 3 Encounters:   12/29/17 156 lb 9.6 oz (71 kg)   04/24/17 150 lb 8 oz (68.3 kg)   12/19/16 141 lb (64 kg)              Today, you had the following     No orders found for display         Today's Medication Changes          These changes are accurate as of: 12/29/17 10:43 AM.  If you have any questions, ask your nurse or doctor.               Start taking these medicines.        Dose/Directions    amoxicillin-clavulanate " 875-125 MG per tablet   Commonly known as:  AUGMENTIN   Used for:  Acute sinusitis with symptoms > 10 days   Started by:  Marvin Casarez PA-C        Dose:  1 tablet   Take 1 tablet by mouth 2 times daily   Quantity:  20 tablet   Refills:  0            Where to get your medicines      Some of these will need a paper prescription and others can be bought over the counter.  Ask your nurse if you have questions.     Bring a paper prescription for each of these medications     amoxicillin-clavulanate 875-125 MG per tablet                Primary Care Provider Office Phone # Fax #    Belgica Vogel [phone] [address]  [facility] [address]        Equal Access to Services     RODO WINKLER : Nova Bonilla, waaxda joann, qaybta kaalmada carole, leena anderson . So [facility] [phone].    ATENCIÓN: Si habla español, tiene a de leon disposición servicios gratuitos de asistencia lingüística. [facility] [phone].    We comply with applicable federal civil rights laws and [place] laws. We do not discriminate on the basis of race, color, national origin, age, disability, sex, sexual orientation, or gender identity.            Thank you!     Thank you for choosing [facility]  for your care. Our goal is always to provide you with excellent care. Hearing back from our patients is one way we can continue to improve our services. Please take a few minutes to complete the written survey that you may receive in the mail after your visit with us. Thank you!             Your Updated Medication List - Protect others around you: Learn how to safely use, store and throw away your medicines at www.disposemymeds.org.          This list is accurate as of: 12/29/17 10:43 AM.  Always use your most recent med list.                   Brand Name Dispense Instructions for use Diagnosis    ALPRAZolam 0.25 MG tablet    XANAX    20 tablet    Take 1  tablet (0.25 mg) by mouth 3 times daily as needed for anxiety    Anxiety       amoxicillin-clavulanate 875-125 MG per tablet    AUGMENTIN    20 tablet    Take 1 tablet by mouth 2 times daily    Acute sinusitis with symptoms > 10 days       escitalopram 10 MG tablet    LEXAPRO    90 tablet    TAKE 1 TABLET(10 MG) BY MOUTH DAILY    Anxiety       losartan 100 MG tablet    COZAAR    90 tablet    Take 1 tablet (100 mg) by mouth daily    Essential hypertension with goal blood pressure less than 140/90

## 2018-01-17 ENCOUNTER — TELEPHONE (OUTPATIENT)
Dept: FAMILY MEDICINE | Facility: CLINIC | Age: 43
End: 2018-01-17

## 2018-01-17 NOTE — TELEPHONE ENCOUNTER
"Patient Communication Preferences indicate  Do not contact  and/or communication by \"Phone\" is not preferred. Call not required per Outreach team.      Outreach ,  Manny Calle     "

## 2018-01-19 NOTE — PROGRESS NOTES
SUBJECTIVE:  Dorie Armendariz, a 42 year old female, is here to discuss the following issues:     HYPERTENSION FOLLOW-UP    Current medication regimen includes losartan 100 mg.   Patient reports no problems or side effects with the medication.  Headaches:  No  Dizziness: No  Patient does check blood pressure outside of clinic.  It generally runs around 120's/80's      DEPRESSION AND ANXIETY FOLLOW-UP   She was on ecitalopram for this but weaned off of it in the fall.  No withdrawal symptoms and mood felt stable coming off the SSRI.  She has also used alprazolam prn in the past - last got #20 tabs 9 months ago but she states she did not fill that script (which is confirmed by [place] Prescription Monitoring Program).    She would like to get new script for alprazolam as she has upcoming travel which is an anxiety trigger for her.    She describes her mood as good.  She does not see a therapist.      PHQ-9 SCORE 12/19/2016 4/24/2017 1/29/2018   Total Score 1 1 0     No flowsheet data found.  DEVYN-7 SCORE 12/19/2016 4/24/2017 1/29/2018   Total Score 0 1 0       Feels a slight bulge over LUQ of her abdomen - most noticeable when standing.      Problem list and histories reviewed & updated, as indicated.  Patient Active Problem List   Diagnosis     Hypertension goal BP (blood pressure) < 140/90     Anxiety     Major depression     Diffuse photodamage of skin       BP Readings from Last 3 Encounters:   01/29/18 130/90   12/29/17 126/76   04/24/17 128/84    Wt Readings from Last 10 Encounters:   01/29/18 148 lb 12 oz (67.5 kg)   12/29/17 156 lb 9.6 oz (71 kg)   04/24/17 150 lb 8 oz (68.3 kg)   12/19/16 141 lb (64 kg)   11/10/16 140 lb (63.5 kg)   10/03/16 143 lb (64.9 kg)   07/27/16 141 lb (64 kg)   06/27/16 140 lb (63.5 kg)   05/04/16 141 lb (64 kg)   03/16/16 142 lb (64.4 kg)              ROS:  RESP: NEGATIVE for shortness of breath  CV: NEGATIVE for chest pain    OBJECTIVE:    /90 (Cuff Size: Adult Regular)  Pulse 80   "Temp 98.2  F (36.8  C) (Oral)  Resp 12  Ht 5' 4.25\" (1.632 m)  Wt 148 lb 12 oz (67.5 kg)  SpO2 97%  BMI 25.33 kg/m2  GEN:  no apparent distress  LUNGS:  normal respiratory effort, and lungs clear to auscultation bilaterally - no rales, rhonchi or wheezes  CV: regular rate and rhythm, normal S1 S2, no S3 or S4 and no murmur, click or rub  ABD:  soft, slight prominence of contour of abdominal wall over RUQ with no palpable mass, mild epigastric tenderness to palpation with no rebound/guarding, no hepatosplenomegaly, no hernias   PSYCH:    Appearance: appropriately and casually dressed    Eye Contact: good  Attitude: cooperative    Speech:  slow, quiet, hesitant    Thought Form: organized and goal oriented    Thought Content: no evidence of psychotic thought    Mood: anxious    Affect: mood congruent    Insight: good       ASSESSMENT/PLAN:  1. Anxiety  MDD has resolved.  I did renew alprazolam script.    - ALPRAZolam (XANAX) 0.25 MG tablet; Take 1 tablet (0.25 mg) by mouth 3 times daily as needed for anxiety  Dispense: 20 tablet; Refill: 0    2. Essential hypertension with goal blood pressure less than 140/90  suboptimal control.  She'll continue losartan at same dose and return to clinic for BP recheck (and annual BMP recheck) in a couple months.    - losartan (COZAAR) 100 MG tablet; Take 1 tablet (100 mg) by mouth daily  Dispense: 90 tablet; Refill: 3  - **Basic metabolic panel FUTURE 2mo; Future       Patient Instructions   Schedule a nurse-only appointment (for BP check) and a lab-only appointment (for Basic panel recheck) in March or April.      Belgica Vogel MD   [facility]     "

## 2018-01-29 ENCOUNTER — OFFICE VISIT (OUTPATIENT)
Dept: FAMILY MEDICINE | Facility: CLINIC | Age: 43
End: 2018-01-29
Payer: COMMERCIAL

## 2018-01-29 VITALS
HEIGHT: 64 IN | RESPIRATION RATE: 12 BRPM | TEMPERATURE: 98.2 F | DIASTOLIC BLOOD PRESSURE: 90 MMHG | WEIGHT: 148.75 LBS | HEART RATE: 80 BPM | SYSTOLIC BLOOD PRESSURE: 130 MMHG | BODY MASS INDEX: 25.39 KG/M2 | OXYGEN SATURATION: 97 %

## 2018-01-29 DIAGNOSIS — F41.9 ANXIETY: ICD-10-CM

## 2018-01-29 DIAGNOSIS — I10 ESSENTIAL HYPERTENSION WITH GOAL BLOOD PRESSURE LESS THAN 140/90: ICD-10-CM

## 2018-01-29 PROCEDURE — 99214 OFFICE O/P EST MOD 30 MIN: CPT | Performed by: FAMILY MEDICINE

## 2018-01-29 RX ORDER — LOSARTAN POTASSIUM 100 MG/1
100 TABLET ORAL DAILY
Qty: 90 TABLET | Refills: 3 | Status: SHIPPED | OUTPATIENT
Start: 2018-01-29 | End: 2018-05-01

## 2018-01-29 RX ORDER — ALPRAZOLAM 0.25 MG
0.25 TABLET ORAL 3 TIMES DAILY PRN
Qty: 20 TABLET | Refills: 0 | Status: SHIPPED | OUTPATIENT
Start: 2018-01-29 | End: 2019-01-10

## 2018-01-29 ASSESSMENT — ANXIETY QUESTIONNAIRES

## 2018-01-29 ASSESSMENT — PATIENT HEALTH QUESTIONNAIRE - PHQ9: 5. POOR APPETITE OR OVEREATING: NOT AT ALL

## 2018-01-29 NOTE — MR AVS SNAPSHOT
After Visit Summary   1/29/2018    Dorie Toddartan    MRN: [id number]           Patient Information     Date Of Birth          1975        Visit Information        Provider Department      1/29/2018 5:00 PM Belgica Vogel MD [facility]        Today's Diagnoses     Anxiety        Essential hypertension with goal blood pressure less than 140/90          Care Instructions    Schedule a nurse-only appointment (for BP check) and a lab-only appointment (for Basic panel recheck) in March or April.          Follow-ups after your visit        Future tests that were ordered for you today     Open Future Orders        Priority Expected Expires Ordered    **Basic metabolic panel FUTURE 2mo Routine 3/30/2018 5/29/2018 1/29/2018            Who to contact     If you have questions or need follow up information about today's clinic visit or your schedule please contact [facility] directly at [phone].  Normal or non-critical lab and imaging results will be communicated to you by MyChart, letter or phone within 4 business days after the clinic has received the results. If you do not hear from us within 7 days, please contact the clinic through Roojoomhart or phone. If you have a critical or abnormal lab result, we will notify you by phone as soon as possible.  Submit refill requests through Easy Bill Online or call your pharmacy and they will forward the refill request to us. Please allow 3 business days for your refill to be completed.          Additional Information About Your Visit        MyChart Information     Easy Bill Online gives you secure access to your electronic health record. If you see a primary care provider, you can also send messages to your care team and make appointments. If you have questions, please call your primary care clinic.  If you do not have a primary care provider, please call [phone] and they will assist you.        Care EveryWhere ID     This is your Care  "EveryWhere ID. This could be used by other organizations to access your [facility] medical records  [id number]        Your Vitals Were     Pulse Temperature Respirations Height Pulse Oximetry BMI (Body Mass Index)    80 98.2  F (36.8  C) (Oral) 12 5' 4.25\" (1.632 m) 97% 25.33 kg/m2       Blood Pressure from Last 3 Encounters:   01/29/18 130/90   12/29/17 126/76   04/24/17 128/84    Weight from Last 3 Encounters:   01/29/18 148 lb 12 oz (67.5 kg)   12/29/17 156 lb 9.6 oz (71 kg)   04/24/17 150 lb 8 oz (68.3 kg)                 Today's Medication Changes          These changes are accurate as of 1/29/18  5:26 PM.  If you have any questions, ask your nurse or doctor.               Stop taking these medicines if you haven't already. Please contact your care team if you have questions.     amoxicillin-clavulanate 875-125 MG per tablet   Commonly known as:  AUGMENTIN   Stopped by:  Belgica Vogel MD           escitalopram 10 MG tablet   Commonly known as:  LEXAPRO   Stopped by:  Belgica Vogel MD                Where to get your medicines      These medications were sent to [facility] - [address]  [address] [place] [address]     Phone:  [phone]     losartan 100 MG tablet         Some of these will need a paper prescription and others can be bought over the counter.  Ask your nurse if you have questions.     Bring a paper prescription for each of these medications     ALPRAZolam 0.25 MG tablet                Primary Care Provider Office Phone # Fax #    Belgica Vogel [phone] [address]  [facility] [address]        Equal Access to Services     RODO WINKLER : Nova Bonilla, thad david, qaleena henderson. So [facility] [phone].    ATENCIÓN: Si habla español, tiene a de leon disposición servicios gratuitos de asistencia lingüística. Llame al [phone].    We comply " with applicable federal civil rights laws and [place] laws. We do not discriminate on the basis of race, color, national origin, age, disability, sex, sexual orientation, or gender identity.            Thank you!     Thank you for choosing [facility]  for your care. Our goal is always to provide you with excellent care. Hearing back from our patients is one way we can continue to improve our services. Please take a few minutes to complete the written survey that you may receive in the mail after your visit with us. Thank you!             Your Updated Medication List - Protect others around you: Learn how to safely use, store and throw away your medicines at www.disposemymeds.org.          This list is accurate as of 1/29/18  5:26 PM.  Always use your most recent med list.                   Brand Name Dispense Instructions for use Diagnosis    ALPRAZolam 0.25 MG tablet    XANAX    20 tablet    Take 1 tablet (0.25 mg) by mouth 3 times daily as needed for anxiety    Anxiety       losartan 100 MG tablet    COZAAR    90 tablet    Take 1 tablet (100 mg) by mouth daily    Essential hypertension with goal blood pressure less than 140/90

## 2018-01-29 NOTE — NURSING NOTE
"Chief Complaint   Patient presents with     Hypertension     Depression       Initial /90 (Cuff Size: Adult Regular)  Pulse 80  Temp 98.2  F (36.8  C) (Oral)  Resp 12  Ht 5' 4.25\" (1.632 m)  Wt 148 lb 12 oz (67.5 kg)  SpO2 97%  BMI 25.33 kg/m2 Estimated body mass index is 25.33 kg/(m^2) as calculated from the following:    Height as of this encounter: 5' 4.25\" (1.632 m).    Weight as of this encounter: 148 lb 12 oz (67.5 kg).  Medication Reconciliation: complete     Amarilis Schmitt CMA      "

## 2018-01-29 NOTE — PATIENT INSTRUCTIONS
Schedule a nurse-only appointment (for BP check) and a lab-only appointment (for Basic panel recheck) in March or April.

## 2018-01-30 ASSESSMENT — PATIENT HEALTH QUESTIONNAIRE - PHQ9: SUM OF ALL RESPONSES TO PHQ QUESTIONS 1-9: 0

## 2018-01-30 ASSESSMENT — ANXIETY QUESTIONNAIRES: GAD7 TOTAL SCORE: 0

## 2018-04-03 ENCOUNTER — ALLIED HEALTH/NURSE VISIT (OUTPATIENT)
Dept: NURSING | Facility: CLINIC | Age: 43
End: 2018-04-03
Payer: COMMERCIAL

## 2018-04-03 VITALS — DIASTOLIC BLOOD PRESSURE: 87 MMHG | OXYGEN SATURATION: 97 % | HEART RATE: 90 BPM | SYSTOLIC BLOOD PRESSURE: 131 MMHG

## 2018-04-03 DIAGNOSIS — I10 HTN (HYPERTENSION): Primary | ICD-10-CM

## 2018-04-03 DIAGNOSIS — I10 ESSENTIAL HYPERTENSION WITH GOAL BLOOD PRESSURE LESS THAN 140/90: ICD-10-CM

## 2018-04-03 LAB
ANION GAP SERPL CALCULATED.3IONS-SCNC: 5 MMOL/L (ref 3–14)
BUN SERPL-MCNC: 8 MG/DL (ref 7–30)
CALCIUM SERPL-MCNC: 8.7 MG/DL (ref 8.5–10.1)
CHLORIDE SERPL-SCNC: 108 MMOL/L (ref 94–109)
CO2 SERPL-SCNC: 26 MMOL/L (ref 20–32)
CREAT SERPL-MCNC: 0.75 MG/DL (ref 0.52–1.04)
GFR SERPL CREATININE-BSD FRML MDRD: 85 ML/MIN/1.7M2
GLUCOSE SERPL-MCNC: 92 MG/DL (ref 70–99)
POTASSIUM SERPL-SCNC: 4.1 MMOL/L (ref 3.4–5.3)
SODIUM SERPL-SCNC: 139 MMOL/L (ref 133–144)

## 2018-04-03 PROCEDURE — 36415 COLL VENOUS BLD VENIPUNCTURE: CPT | Performed by: FAMILY MEDICINE

## 2018-04-03 PROCEDURE — 99207 ZZC NO CHARGE NURSE ONLY: CPT

## 2018-04-03 PROCEDURE — 80048 BASIC METABOLIC PNL TOTAL CA: CPT | Performed by: FAMILY MEDICINE

## 2018-04-03 NOTE — PROGRESS NOTES
Your glucose level, electrolyte level and kidney function, measured with a test called the basic metabolic panel, is normal, which is great news!     If you have any questions, please contact the clinic or schedule an appointment with me, thank you!    Sincerely,    CODI FELICIANO MD   4/3/2018

## 2018-04-13 NOTE — PROGRESS NOTES
"  SUBJECTIVE:  Dorie Armendariz, a 42 year old female, is here to discuss the following issues:     Pain in Clavicle  Location: left  Radiation: into upper left chest and up left side of neck posteriorly.  Duration: weeks  Severity: mild - severe  Quality: dull pain  Timing: Worse as the day progresses.  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  Modifying Factors: none noted - not worse with shoveling  Associated symptoms: None     She is RHD  No history of injury.    Insidious onset - first noticed this a couple months ago lying on left side.  Some radiation down upper arm.    Nonexertional.  Not related to eating/meals.      Problem list and histories reviewed & updated, as indicated.  Patient Active Problem List   Diagnosis     Hypertension goal BP (blood pressure) < 140/90     Anxiety     Diffuse photodamage of skin       BP Readings from Last 3 Encounters:   04/16/18 133/89   04/03/18 131/87   01/29/18 130/90    Wt Readings from Last 3 Encounters:   04/16/18 154 lb 4 oz (70 kg)   01/29/18 148 lb 12 oz (67.5 kg)   12/29/17 156 lb 9.6 oz (71 kg)            ROS:  RESP: NEGATIVE for shortness of breath or cough  CV: NEGATIVE for palpitations  GI: NEGATIVE for nausea  NEURO:  NEGATIVE for numbness, tingling, weakness     OBJECTIVE:    /89  Pulse 88  Temp 97.9  F (36.6  C) (Oral)  Ht 5' 4\" (1.626 m)  Wt 154 lb 4 oz (70 kg)  SpO2 98%  BMI 26.48 kg/m2  GEN:  no apparent distress  NECK:  no tenderness to palpation over the spinous processes.  Some tenderness to palpation over left paraspinal muscles and upper border of left trapezius. Spurling's is negative  SHOULDER:  Inspection of left shoulder reveals no deformity/asymmetry.  ROM is full but painful with abduction and forward flexion.  Mild tenderness to palpation over the AC joint.  Rotater cuff strength intact.  Impingement signs:  Hawkin's negative, Neer's negative, Subscapularis negative, Infraspinatis negative.  CHEST:  No tenderness to " palpation over sterno-clavicular or costo-chondral joints   LUNGS:  normal respiratory effort, and lungs clear to auscultation bilaterally - no rales, rhonchi or wheezes  CV: regular rate and rhythm, normal S1 S2, no S3 or S4 and no murmur, click or rub     ASSESSMENT/PLAN:    ICD-10-CM    1. Pain of left clavicle M89.8X1 XR Shoulder Left G/E 3 Views     XR Clavicle Left     [facility]   2. Acute pain of left shoulder M25.512 XR Shoulder Left G/E 3 Views     XR Clavicle Left     [facility]     Discussed that I think this is most likely MSK in etiology given that abduction and flexion at the shoulder exacerbate the pain and the pain is positional.  This makes other etiologies of upper left-sided chest pain (cardiac, pulmonary) less likely.  It is possible that this is neuropathic/radicular pain but that also seems less likely.  I would like to treat with a course of Physical Therapy.  If not responding to that I would consider referral to Sports Med - unless the pattern of her symptoms changes to suggest another etiology.    Patient instructed to contact clinic if symptoms persist, worsen, or do not resolve as anticipated.        Belgica Vogel MD   [facility]

## 2018-04-16 ENCOUNTER — OFFICE VISIT (OUTPATIENT)
Dept: FAMILY MEDICINE | Facility: CLINIC | Age: 43
End: 2018-04-16
Payer: COMMERCIAL

## 2018-04-16 ENCOUNTER — RADIANT APPOINTMENT (OUTPATIENT)
Dept: GENERAL RADIOLOGY | Facility: CLINIC | Age: 43
End: 2018-04-16
Attending: FAMILY MEDICINE
Payer: COMMERCIAL

## 2018-04-16 VITALS
SYSTOLIC BLOOD PRESSURE: 133 MMHG | WEIGHT: 154.25 LBS | TEMPERATURE: 97.9 F | HEART RATE: 88 BPM | DIASTOLIC BLOOD PRESSURE: 89 MMHG | HEIGHT: 64 IN | BODY MASS INDEX: 26.34 KG/M2 | OXYGEN SATURATION: 98 %

## 2018-04-16 DIAGNOSIS — M89.8X1 PAIN OF LEFT CLAVICLE: ICD-10-CM

## 2018-04-16 DIAGNOSIS — M25.512 ACUTE PAIN OF LEFT SHOULDER: ICD-10-CM

## 2018-04-16 DIAGNOSIS — M89.8X1 PAIN OF LEFT CLAVICLE: Primary | ICD-10-CM

## 2018-04-16 PROCEDURE — 99213 OFFICE O/P EST LOW 20 MIN: CPT | Performed by: FAMILY MEDICINE

## 2018-04-16 PROCEDURE — 73000 X-RAY EXAM OF COLLAR BONE: CPT | Mod: LT

## 2018-04-16 PROCEDURE — 73030 X-RAY EXAM OF SHOULDER: CPT | Mod: LT

## 2018-04-16 NOTE — MR AVS SNAPSHOT
After Visit Summary   4/16/2018    Dorie Armendariz    MRN: [id number]           Patient Information     Date Of Birth          1975        Visit Information        Provider Department      4/16/2018 5:00 PM Belgica Vogel MD [facility]        Today's Diagnoses     Pain of left clavicle    -  1    Acute pain of left shoulder           Follow-ups after your visit        Additional Services     [facility] PT, HAND, AND CHIROPRACTIC REFERRAL       **This order will print in the [facility] Scheduling Office**    Physical Therapy, Hand Therapy and Chiropractic Care are available through:    *[facility]  *[facility]  *[facility]    Call one number to schedule at any of the above locations: [phone].    Your provider has referred you to: Physical Therapy at [facility] or [facility]    Indication/Reason for Referral: Shoulder Pain  Onset of Illness: weeks ago  Therapy Orders: Evaluate and Treat  Special Programs: None  Special Request: None    Humberto Paul      Additional Comments for the Therapist or Chiropractor: If not improving I will have patient see Sports Med    Please be aware that coverage of these services is subject to the terms and limitations of your health insurance plan.  Call member services at your health plan with any benefit or coverage questions.      Please bring the following to your appointment:    *Your personal calendar for scheduling future appointments  *Comfortable clothing                  Future tests that were ordered for you today     Open Future Orders        Priority Expected Expires Ordered    XR Shoulder Left G/E 3 Views Routine 4/16/2018 4/16/2019 4/16/2018    XR Clavicle Left Routine 4/16/2018 4/16/2019 4/16/2018            Who to contact     If you have questions or need follow up information about today's clinic visit or your schedule please contact [facility] directly at [phone].  Normal or  "non-critical lab and imaging results will be communicated to you by MyChart, letter or phone within 4 business days after the clinic has received the results. If you do not hear from us within 7 days, please contact the clinic through AthletePatht or phone. If you have a critical or abnormal lab result, we will notify you by phone as soon as possible.  Submit refill requests through Velo Media or call your pharmacy and they will forward the refill request to us. Please allow 3 business days for your refill to be completed.          Additional Information About Your Visit        Velo Media Information     [employer] gives you secure access to your electronic health record. If you see a primary care provider, you can also send messages to your care team and make appointments. If you have questions, please call your primary care clinic.  If you do not have a primary care provider, please call [phone] and they will assist you.        Care EveryWhere ID     This is your Care EveryWhere ID. This could be used by other organizations to access your [facility] medical records  [id number]        Your Vitals Were     Pulse Temperature Height Pulse Oximetry BMI (Body Mass Index)       88 97.9  F (36.6  C) (Oral) 5' 4\" (1.626 m) 98% 26.48 kg/m2        Blood Pressure from Last 3 Encounters:   04/16/18 133/89   04/03/18 131/87   01/29/18 130/90    Weight from Last 3 Encounters:   04/16/18 154 lb 4 oz (70 kg)   01/29/18 148 lb 12 oz (67.5 kg)   12/29/17 156 lb 9.6 oz (71 kg)              We Performed the Following     [facility] PT, HAND, AND CHIROPRACTIC REFERRAL        Primary Care Provider Office Phone # Fax #    Belgica Vogel [phone] [address]  [facility] [address]        Equal Access to Services     RODO WINKLER : Hadii alexis Bonilla, waaxda lumagdalenaadaha, qaybta kaalmada carole, leena babin. So [facility] [phone].    ATENCIÓN: Si habla español, tiene a de leon disposición " servicios gratuitos de asistencia lingüística. Jordyn simeon [phone].    We comply with applicable federal civil rights laws and [place] laws. We do not discriminate on the basis of race, color, national origin, age, disability, sex, sexual orientation, or gender identity.            Thank you!     Thank you for choosing [facility]  for your care. Our goal is always to provide you with excellent care. Hearing back from our patients is one way we can continue to improve our services. Please take a few minutes to complete the written survey that you may receive in the mail after your visit with us. Thank you!             Your Updated Medication List - Protect others around you: Learn how to safely use, store and throw away your medicines at www.disposemymeds.org.          This list is accurate as of 4/16/18  5:39 PM.  Always use your most recent med list.                   Brand Name Dispense Instructions for use Diagnosis    ALPRAZolam 0.25 MG tablet    XANAX    20 tablet    Take 1 tablet (0.25 mg) by mouth 3 times daily as needed for anxiety    Anxiety       losartan 100 MG tablet    COZAAR    90 tablet    Take 1 tablet (100 mg) by mouth daily    Essential hypertension with goal blood pressure less than 140/90

## 2018-05-01 ENCOUNTER — MYC MEDICAL ADVICE (OUTPATIENT)
Dept: FAMILY MEDICINE | Facility: CLINIC | Age: 43
End: 2018-05-01

## 2018-05-01 DIAGNOSIS — I10 ESSENTIAL HYPERTENSION WITH GOAL BLOOD PRESSURE LESS THAN 140/90: ICD-10-CM

## 2018-05-01 RX ORDER — LOSARTAN POTASSIUM 100 MG/1
100 TABLET ORAL DAILY
Qty: 90 TABLET | Refills: 2 | Status: SHIPPED | OUTPATIENT
Start: 2018-05-01 | End: 2018-12-31

## 2018-06-03 ENCOUNTER — OFFICE VISIT (OUTPATIENT)
Dept: URGENT CARE | Facility: URGENT CARE | Age: 43
End: 2018-06-03
Payer: COMMERCIAL

## 2018-06-03 VITALS
HEART RATE: 88 BPM | OXYGEN SATURATION: 99 % | BODY MASS INDEX: 24.99 KG/M2 | DIASTOLIC BLOOD PRESSURE: 78 MMHG | HEIGHT: 65 IN | WEIGHT: 150 LBS | SYSTOLIC BLOOD PRESSURE: 128 MMHG | TEMPERATURE: 98.6 F

## 2018-06-03 DIAGNOSIS — R10.84 ABDOMINAL PAIN, GENERALIZED: Primary | ICD-10-CM

## 2018-06-03 PROCEDURE — 99214 OFFICE O/P EST MOD 30 MIN: CPT | Performed by: INTERNAL MEDICINE

## 2018-06-03 ASSESSMENT — ENCOUNTER SYMPTOMS
DYSURIA: 0
CHILLS: 0
FEVER: 0
HEMATURIA: 0

## 2018-06-03 NOTE — PROGRESS NOTES
SUBJECTIVE:   Dorie Armendariz is a 42 year old female presenting with a chief complaint of   Chief Complaint   Patient presents with     Abdominal Pain     middle right quadrant pain abdomen for 4 days, no nausea/vomiting/diarrhea, denies dysuria - has had appendectomy in the past, hx of liver problem      Urgent Care       She is an established patient of [facility].    Abdominal Pain    Location: RML/RUQ abd pain   Radiation: None.    Pain character: sharp, intermittently  Severity: at times pain can be 6 - 8 on a scale of 1-10.    Duration: 4 day(s)   Course of Illness: fluctuating.  Exacerbated by: nothing  Relieved by: nothing.  treatment omeprazole -this did not help  Associated Symptoms: none    Have chronic problems with bowel movements - constipation followed by loose stools    denies  nausea, vomiting, diarrhea, melena, hematochezia, dysuria, frequency, hematuria, belching, flatus, fever, chills, sweats, myalgias and headache.    Patient is concerned about her gallbladder    Surgical History: appendectomy and exp lap  Hysterectomy,  Cyst removed from ovary    No PMS symptoms currently    hosp with liver infection in past  Review of Systems   Constitutional: Negative for chills and fever.   Genitourinary: Negative for dysuria and hematuria.       Past Medical History:   Diagnosis Date     Anxiety     citalopram in the past, minimal xanax use      Hypertension goal BP (blood pressure) < 140/90      IBS (irritable bowel syndrome)      Major depression 11/14/2016     Migraine      Ovarian cyst      Uterine fibroid      Viral hepatitis 4/2013     Family History   Problem Relation Age of Onset     Hypertension Mother      Hypertension Father      Prostate Cancer Father      Lipids Father      Coronary Artery Disease Father 79     CAD, double bypass     Hypertension Brother      Hypertension Sister      Current Outpatient Prescriptions   Medication Sig Dispense Refill     ALPRAZolam (XANAX) 0.25 MG tablet Take 1  "tablet (0.25 mg) by mouth 3 times daily as needed for anxiety 20 tablet 0     losartan (COZAAR) 100 MG tablet Take 1 tablet (100 mg) by mouth daily 90 tablet 2     Social History   Substance Use Topics     Smoking status: Never Smoker     Smokeless tobacco: Never Used     Alcohol use Yes      Comment: 2-3 drinks a week        OBJECTIVE  /78 (BP Location: Left arm, Patient Position: Chair, Cuff Size: Adult Regular)  Pulse 88  Temp 98.6  F (37  C) (Oral)  Ht 5' 4.5\" (1.638 m)  Wt 150 lb (68 kg)  SpO2 99%  BMI 25.35 kg/m2    Physical Exam   Constitutional: She appears well-developed and well-nourished.   Cardiovascular: Normal rate, regular rhythm, normal heart sounds and intact distal pulses.    Pulmonary/Chest: Effort normal and breath sounds normal.   Abdominal: Soft. Bowel sounds are normal.   On exam patient did not have right upper quadrant, right mid quadrant tenderness present as she has been experiencing  On exam she did have some epigastric periumbilical suprapubic left lower quadrant and left mid quadrant discomfort without guarding or rebound   Musculoskeletal:   No CVA or back tenderness noted  Right flank discomfort       Labs: CBC, cmp, lipase, UA  No results found for this or any previous visit (from the past 24 hour(s)).    X-Ray was ordered    ASSESSMENT:      ICD-10-CM    1. Abdominal pain, generalized R10.84 CANCELED: CBC with platelets and differential     CANCELED: *UA reflex to Microscopic and Culture ([facility] (except [place] and [place])     CANCELED: Comprehensive metabolic panel (BMP + Alb, Alk Phos, ALT, AST, Total. Bili, TP)     CANCELED: Lipase     CANCELED: XR Abdomen 2 Views        Medical Decision Making:    Differential Diagnosis:  Pt's concern is gallbladder dz/stones  Abdominal Pain: Constipation/gags, GB/Cholelithiasis, Diverticular Disease, UTI, Pyelonephritis, Kidney Stone, Bowel Obstruction, Adhesions, Pancreatitis and Hepatitis    Serious " Comorbid Conditions:  Adult:  History of multiple abdominal surgeries    PLAN:      Discussed with patient that I could initiate workup for abdominal pain but I would not be able to order imaging.  Discussed that to have a full workup performed she would need in the emergency room to have an ultrasound or CAT scan performed.    Discussed I would initiate the workup here with performing an abdominal x-ray (which I could potentially see gas and stool patterns, potential obstruction, kidney stone.)  I would obtain a urinalysis which would evaluate for stone or infection.    I would check a white count  -which could suggest whether there is significant infection going on.    Also order metabolic panel, liver tests and pancreas test but discussed it would not be back till tomorrow evening.    Patient seen okay with workup as she did not want to go to the emergency room and was planning to follow-up with her primary caregiver.    In the meantime within 5 minutes of placing orders and myself leaving room, patient left a note on the whiteboard of her door stating that she discharged herself from clinic.    I suspect that she will return to follow-up with her primary caregiver tomorrow,   otherwise she understands that she may go to the emergency room if pain worsens.    I will CC chart to PCP    Patient Instructions

## 2018-06-03 NOTE — MR AVS SNAPSHOT
"              After Visit Summary   6/3/2018    Dorie Armendariz    MRN: [id number]           Patient Information     Date Of Birth          1975        Visit Information        Provider Department      6/3/2018 1:05 PM Aria Brennan MD [facility]        Today's Diagnoses     Abdominal pain, generalized    -  1       Follow-ups after your visit        Who to contact     If you have questions or need follow up information about today's clinic visit or your schedule please contact [facility] directly at [phone].  Normal or non-critical lab and imaging results will be communicated to you by [email or web address], letter or phone within 4 business days after the clinic has received the results. If you do not hear from us within 7 days, please contact the clinic through BreakingPoint Systemst or phone. If you have a critical or abnormal lab result, we will notify you by phone as soon as possible.  Submit refill requests through Kin[employer] or call your pharmacy and they will forward the refill request to us. Please allow 3 business days for your refill to be completed.          Additional Information About Your Visit        MyChart Information     [employer] gives you secure access to your electronic health record. If you see a primary care provider, you can also send messages to your care team and make appointments. If you have questions, please call your primary care clinic.  If you do not have a primary care provider, please call [phone] and they will assist you.        Care EveryWhere ID     This is your Care EveryWhere ID. This could be used by other organizations to access your [place] medical records  [id number]        Your Vitals Were     Pulse Temperature Height Pulse Oximetry BMI (Body Mass Index)       88 98.6  F (37  C) (Oral) 5' 4.5\" (1.638 m) 99% 25.35 kg/m2        Blood Pressure from Last 3 Encounters:   06/03/18 128/78   04/16/18 133/89   04/03/18 131/87    Weight from " Last 3 Encounters:   06/03/18 150 lb (68 kg)   04/16/18 154 lb 4 oz (70 kg)   01/29/18 148 lb 12 oz (67.5 kg)              Today, you had the following     No orders found for display       Primary Care Provider Office Phone # Fax #    Belgica Vogel [phone]       [address]  [facility] [address]        Equal Access to Services     RODO WINKLER : Hadii aad ku hadasho Soomaali, waaxda luqadaha, qaybta kaalmada adeegyada, waxay idiin hayaan adeeg kharash laestrella . So [facility] [phone].    ATENCIÓN: Si ashtyn grace, tiene a de leon disposición servicios gratuitos de asistencia lingüística. Llame al [phone].    We comply with applicable federal civil rights laws and [place] laws. We do not discriminate on the basis of race, color, national origin, age, disability, sex, sexual orientation, or gender identity.            Thank you!     Thank you for choosing [facility]  for your care. Our goal is always to provide you with excellent care. Hearing back from our patients is one way we can continue to improve our services. Please take a few minutes to complete the written survey that you may receive in the mail after your visit with us. Thank you!             Your Updated Medication List - Protect others around you: Learn how to safely use, store and throw away your medicines at www.disposemymeds.org.          This list is accurate as of 6/3/18  4:30 PM.  Always use your most recent med list.                   Brand Name Dispense Instructions for use Diagnosis    ALPRAZolam 0.25 MG tablet    XANAX    20 tablet    Take 1 tablet (0.25 mg) by mouth 3 times daily as needed for anxiety    Anxiety       losartan 100 MG tablet    COZAAR    90 tablet    Take 1 tablet (100 mg) by mouth daily    Essential hypertension with goal blood pressure less than 140/90

## 2018-06-03 NOTE — NURSING NOTE
"Dorie Armendariz;   Chief Complaint   Patient presents with     Abdominal Pain     middle right quadrant pain abdomen for 4 days, no nausea/vomiting/diarrhea, denies dysuria - has had appendectomy in the past, hx of liver problem      Urgent Care     Initial /78 (BP Location: Left arm, Patient Position: Chair, Cuff Size: Adult Regular)  Pulse 88  Temp 98.6  F (37  C) (Oral)  Ht 5' 4.5\" (1.638 m)  Wt 150 lb (68 kg)  SpO2 99%  BMI 25.35 kg/m2 Estimated body mass index is 25.35 kg/(m^2) as calculated from the following:    Height as of this encounter: 5' 4.5\" (1.638 m).    Weight as of this encounter: 150 lb (68 kg)..  BP completed using cuff size regular.  Maris Gibson R.N.      "

## 2018-06-11 ENCOUNTER — RADIANT APPOINTMENT (OUTPATIENT)
Dept: GENERAL RADIOLOGY | Facility: CLINIC | Age: 43
End: 2018-06-11
Attending: FAMILY MEDICINE
Payer: COMMERCIAL

## 2018-06-11 ENCOUNTER — OFFICE VISIT (OUTPATIENT)
Dept: FAMILY MEDICINE | Facility: CLINIC | Age: 43
End: 2018-06-11
Payer: COMMERCIAL

## 2018-06-11 VITALS
HEART RATE: 86 BPM | DIASTOLIC BLOOD PRESSURE: 94 MMHG | SYSTOLIC BLOOD PRESSURE: 136 MMHG | OXYGEN SATURATION: 99 % | RESPIRATION RATE: 12 BRPM | BODY MASS INDEX: 25.24 KG/M2 | HEIGHT: 65 IN | WEIGHT: 151.5 LBS | TEMPERATURE: 97.9 F

## 2018-06-11 DIAGNOSIS — R10.32 ABDOMINAL PAIN, LEFT LOWER QUADRANT: ICD-10-CM

## 2018-06-11 DIAGNOSIS — K59.00 CONSTIPATION, UNSPECIFIED CONSTIPATION TYPE: ICD-10-CM

## 2018-06-11 DIAGNOSIS — R10.32 ABDOMINAL PAIN, LEFT LOWER QUADRANT: Primary | ICD-10-CM

## 2018-06-11 LAB
ALBUMIN UR-MCNC: NEGATIVE MG/DL
APPEARANCE UR: CLEAR
BILIRUB UR QL STRIP: NEGATIVE
COLOR UR AUTO: YELLOW
GLUCOSE UR STRIP-MCNC: NEGATIVE MG/DL
HGB UR QL STRIP: NEGATIVE
KETONES UR STRIP-MCNC: NEGATIVE MG/DL
LEUKOCYTE ESTERASE UR QL STRIP: NEGATIVE
NITRATE UR QL: NEGATIVE
PH UR STRIP: 5.5 PH (ref 5–7)
SOURCE: NORMAL
SP GR UR STRIP: 1.02 (ref 1–1.03)
UROBILINOGEN UR STRIP-ACNC: 0.2 EU/DL (ref 0.2–1)

## 2018-06-11 PROCEDURE — 81003 URINALYSIS AUTO W/O SCOPE: CPT | Performed by: FAMILY MEDICINE

## 2018-06-11 PROCEDURE — 99214 OFFICE O/P EST MOD 30 MIN: CPT | Performed by: FAMILY MEDICINE

## 2018-06-11 PROCEDURE — 74019 RADEX ABDOMEN 2 VIEWS: CPT

## 2018-06-11 PROCEDURE — 99207 ZZC FOR CODING REVIEW: CPT | Performed by: FAMILY MEDICINE

## 2018-06-11 RX ORDER — POLYETHYLENE GLYCOL 3350 17 G/17G
1 POWDER, FOR SOLUTION ORAL DAILY
Qty: 510 G | Refills: 11 | Status: SHIPPED | OUTPATIENT
Start: 2018-06-11 | End: 2018-06-11

## 2018-06-11 RX ORDER — POLYETHYLENE GLYCOL 3350 17 G/17G
1 POWDER, FOR SOLUTION ORAL DAILY
Qty: 510 G | Refills: 11 | Status: SHIPPED | OUTPATIENT
Start: 2018-06-11

## 2018-06-11 ASSESSMENT — ANXIETY QUESTIONNAIRES
6. BECOMING EASILY ANNOYED OR IRRITABLE: NOT AT ALL
1. FEELING NERVOUS, ANXIOUS, OR ON EDGE: NOT AT ALL
GAD7 TOTAL SCORE: 0
2. NOT BEING ABLE TO STOP OR CONTROL WORRYING: NOT AT ALL
5. BEING SO RESTLESS THAT IT IS HARD TO SIT STILL: NOT AT ALL
3. WORRYING TOO MUCH ABOUT DIFFERENT THINGS: NOT AT ALL
IF YOU CHECKED OFF ANY PROBLEMS ON THIS QUESTIONNAIRE, HOW DIFFICULT HAVE THESE PROBLEMS MADE IT FOR YOU TO DO YOUR WORK, TAKE CARE OF THINGS AT HOME, OR GET ALONG WITH OTHER PEOPLE: NOT DIFFICULT AT ALL
7. FEELING AFRAID AS IF SOMETHING AWFUL MIGHT HAPPEN: NOT AT ALL

## 2018-06-11 ASSESSMENT — PATIENT HEALTH QUESTIONNAIRE - PHQ9: 5. POOR APPETITE OR OVEREATING: NOT AT ALL

## 2018-06-11 NOTE — PATIENT INSTRUCTIONS
Start the miralax.  Use it regularly to establish regular bowel habits.  If you feel that is successful but the pain is still present see me to discuss next steps.  If the pain improves with improvement of the constipation it was likely related to that.

## 2018-06-11 NOTE — MR AVS SNAPSHOT
After Visit Summary   6/11/2018    Dorie Armendariz    MRN: [id number]           Patient Information     Date Of Birth          1975        Visit Information        Provider Department      6/11/2018 4:40 PM Belgica oVgel MD [facility]        Today's Diagnoses     Abdominal pain, left lower quadrant    -  1    Constipation, unspecified constipation type          Care Instructions    Start the miralax.  Use it regularly to establish regular bowel habits.  If you feel that is successful but the pain is still present see me to discuss next steps.  If the pain improves with improvement of the constipation it was likely related to that.            Follow-ups after your visit        Who to contact     If you have questions or need follow up information about today's clinic visit or your schedule please contact [facility] directly at [phone].  Normal or non-critical lab and imaging results will be communicated to you by Lootsiehart, letter or phone within 4 business days after the clinic has received the results. If you do not hear from us within 7 days, please contact the clinic through Lootsiehart or phone. If you have a critical or abnormal lab result, we will notify you by phone as soon as possible.  Submit refill requests through Urgent.ly or call your pharmacy and they will forward the refill request to us. Please allow 3 business days for your refill to be completed.          Additional Information About Your Visit        Lootsiehart Information     Urgent.ly gives you secure access to your electronic health record. If you see a primary care provider, you can also send messages to your care team and make appointments. If you have questions, please call your primary care clinic.  If you do not have a primary care provider, please call [phone] and they will assist you.        Care EveryWhere ID     This is your Care EveryWhere ID. This could be used by other organizations to  "access your [place] medical records  [id number]        Your Vitals Were     Pulse Temperature Respirations Height Last Period Pulse Oximetry    86 97.9  F (36.6  C) (Oral) 12 5' 4.5\" (1.638 m) 06/11/2013 99%    BMI (Body Mass Index)                   25.6 kg/m2            Blood Pressure from Last 3 Encounters:   06/11/18 (!) 136/94   06/03/18 128/78   04/16/18 133/89    Weight from Last 3 Encounters:   06/11/18 151 lb 8 oz (68.7 kg)   06/03/18 150 lb (68 kg)   04/16/18 154 lb 4 oz (70 kg)              We Performed the Following     *UA reflex to Microscopic and Culture ([facility] (except [place] and [place])     DEPRESSION ACTION PLAN (DAP)          Today's Medication Changes          These changes are accurate as of 6/11/18  5:56 PM.  If you have any questions, ask your nurse or doctor.               Start taking these medicines.        Dose/Directions    polyethylene glycol powder   Commonly known as:  MIRALAX   Used for:  Constipation, unspecified constipation type   Started by:  Belgica Vogel MD        Dose:  1 capful   Take 17 g (1 capful) by mouth daily   Quantity:  510 g   Refills:  11            Where to get your medicines      Some of these will need a paper prescription and others can be bought over the counter.  Ask your nurse if you have questions.     Bring a paper prescription for each of these medications     polyethylene glycol powder                Primary Care Provider Office Phone # Fax #    Belgica Vogel [phone]       [address]  [facility] [address]        Equal Access to Services     [facility]: Hadii alexis prescott Sochrissie, waaxda luqadaha, qaybta kaalmada leena lam idiin hayaan adeeg kharash la'aan . So [facility] [phone].    ATENCIÓN: Si habla español, tiene a de leon disposición servicios gratuitos de asistencia lingüística. Llame al [phone].    We comply with applicable federal civil rights laws and [place] laws. We do " not discriminate on the basis of race, color, national origin, age, disability, sex, sexual orientation, or gender identity.            Thank you!     Thank you for choosing [facility]  for your care. Our goal is always to provide you with excellent care. Hearing back from our patients is one way we can continue to improve our services. Please take a few minutes to complete the written survey that you may receive in the mail after your visit with us. Thank you!             Your Updated Medication List - Protect others around you: Learn how to safely use, store and throw away your medicines at www.disposemymeds.org.          This list is accurate as of 6/11/18  5:56 PM.  Always use your most recent med list.                   Brand Name Dispense Instructions for use Diagnosis    ALPRAZolam 0.25 MG tablet    XANAX    20 tablet    Take 1 tablet (0.25 mg) by mouth 3 times daily as needed for anxiety    Anxiety       losartan 100 MG tablet    COZAAR    90 tablet    Take 1 tablet (100 mg) by mouth daily    Essential hypertension with goal blood pressure less than 140/90       polyethylene glycol powder    MIRALAX    510 g    Take 17 g (1 capful) by mouth daily    Constipation, unspecified constipation type

## 2018-06-11 NOTE — LETTER
My Depression Action Plan  Name: Dorie Armendariz   Date of Birth 1975  Date: 6/11/2018    My doctor: Belgica Vogel   My clinic: [address] [address] [phone]          GREEN    ZONE   Good Control    What it looks like:     Things are going generally well. You have normal up s and down s. You may even feel depressed from time to time, but bad moods usually last less than a day.   What you need to do:  1. Continue to care for yourself (see self care plan)  2. Check your depression survival kit and update it as needed  3. Follow your physician s recommendations including any medication.  4. Do not stop taking medication unless you consult with your physician first.           YELLOW         ZONE Getting Worse    What it looks like:     Depression is starting to interfere with your life.     It may be hard to get out of bed; you may be starting to isolate yourself from others.    Symptoms of depression are starting to last most all day and this has happened for several days.     You may have suicidal thoughts but they are not constant.   What you need to do:     1. Call your care team, your response to treatment will improve if you keep your care team informed of your progress. Yellow periods are signs an adjustment may need to be made.     2. Continue your self-care, even if you have to fake it!    3. Talk to someone in your support network    4. Open up your depression survival kit           RED    ZONE Medical Alert - Get Help    What it looks like:     Depression is seriously interfering with your life.     You may experience these or other symptoms: You can t get out of bed most days, can t work or engage in other necessary activities, you have trouble taking care of basic hygiene, or basic responsibilities, thoughts of suicide or death that will not go away, self-injurious behavior.     What you need to do:  1. Call your care team and  request a same-day appointment. If they are not available (weekends or after hours) call your local crisis line, emergency room or 911.            Depression Self Care Plan / Survival Kit    Self-Care for Depression  Here s the deal. Your body and mind are really not as separate as most people think.  What you do and think affects how you feel and how you feel influences what you do and think. This means if you do things that people who feel good do, it will help you feel better.  Sometimes this is all it takes.  There is also a place for medication and therapy depending on how severe your depression is, so be sure to consult with your medical provider and/ or Behavioral Health Consultant if your symptoms are worsening or not improving.     In order to better manage my stress, I will:    Exercise  Get some form of exercise, every day. This will help reduce pain and release endorphins, the  feel good  chemicals in your brain. This is almost as good as taking antidepressants!  This is not the same as joining a gym and then never going! (they count on that by the way ) It can be as simple as just going for a walk or doing some gardening, anything that will get you moving.      Hygiene   Maintain good hygiene (Get out of bed in the morning, Make your bed, Brush your teeth, Take a shower, and Get dressed like you were going to work, even if you are unemployed).  If your clothes don't fit try to get ones that do.    Diet  I will strive to eat foods that are good for me, drink plenty of water, and avoid excessive sugar, caffeine, alcohol, and other mood-altering substances.  Some foods that are helpful in depression are: complex carbohydrates, B vitamins, flaxseed, fish or fish oil, fresh fruits and vegetables.    Psychotherapy  I agree to participate in Individual Therapy (if recommended).    Medication  If prescribed medications, I agree to take them.  Missing doses can result in serious side effects.  I understand that  drinking alcohol, or other illicit drug use, may cause potential side effects.  I will not stop my medication abruptly without first discussing it with my provider.    Staying Connected With Others  I will stay in touch with my friends, family members, and my primary care provider/team.    Use your imagination  Be creative.  We all have a creative side; it doesn t matter if it s oil painting, sand castles, or mud pies! This will also kick up the endorphins.    Witness Beauty  (AKA stop and smell the roses) Take a look outside, even in mid-winter. Notice colors, textures. Watch the squirrels and birds.     Service to others  Be of service to others.  There is always someone else in need.  By helping others we can  get out of ourselves  and remember the really important things.  This also provides opportunities for practicing all the other parts of the program.    Humor  Laugh and be silly!  Adjust your TV habits for less news and crime-drama and more comedy.    Control your stress  Try breathing deep, massage therapy, biofeedback, and meditation. Find time to relax each day.     My support system    Clinic Contact:  Phone number:    Contact 1:  Phone number:    Contact 2:  Phone number:    [employer]/:  Phone number:    Therapist:  Phone number:    Local crisis center:    Phone number:    Other community support:  Phone number:

## 2018-06-11 NOTE — PROGRESS NOTES
SUBJECTIVE:   Dorie Armendariz is a 42 year old female who presents to clinic today for the following health issues:    Abdominal Pain      Duration: years     Description (location/character/radiation): lower left quadrant        Associated flank pain: None    Intensity:  fluctuates: mild, moderate, severe    Accompanying signs and symptoms: Gas, bloating    ROS:       Fever/Chills: no        Nausea/vomitting: no        Diarrhea: no        Dysuria or Hematuria: no    History (previous similar pain/trauma/previous testing): none    Precipitating or alleviating factors:       Pain worse with eating/BM/urination: no       Pain relieved by BM: no     Therapies tried and outcome: None    LMP:  not applicable-hysterectomy     I saw her for this in 2016.  Still having issues with constipation alternating with looser, frequent stools.  Had fibroid removed in 2013 with left ovarian cystectomy.  Had appendectomy as a child following complex appendicitis with exploratory surgery.    Was in the urgent care last week for 3 days of RUQ pain.  She describes that as a different type of pain and it has since resolved.  Now back to her chronic LLQ/LUQ pain.  Sometimes takes omeprazole or ranitidine for a couple weeks; that used to help but not so much lately.    Uses psyllium and uses probiotic.  Has not been able to pinpoint any patterns of worsening or alleviating factors.    Urinary frequency for past couple months.  Nocturia several times per night now.  No vaginal discharge or itching but sometimes notes deeper, pelvic pain.  Female partner.    Problem list and histories reviewed & adjusted, as indicated.  Additional history: as documented    BP Readings from Last 3 Encounters:   06/11/18 (!) 136/94   06/03/18 128/78   04/16/18 133/89    Wt Readings from Last 3 Encounters:   06/11/18 151 lb 8 oz (68.7 kg)   06/03/18 150 lb (68 kg)   04/16/18 154 lb 4 oz (70 kg)          Reviewed and updated as needed this visit by clinical  "staff  Tobacco  Allergies  Meds  Med Hx  Surg Hx  Fam Hx  Soc Hx        OBJECTIVE:     BP (!) 136/94 (BP Location: Left arm, Patient Position: Sitting, Cuff Size: Adult Regular)  Pulse 86  Temp 97.9  F (36.6  C) (Oral)  Resp 12  Ht 5' 4.5\" (1.638 m)  Wt 151 lb 8 oz (68.7 kg)  LMP 06/11/2013  SpO2 99%  BMI 25.6 kg/m2  Body mass index is 25.6 kg/(m^2).  GEN:  no apparent distress   EYES: conjunctivae and sclerae clear   NECK:  Supple without adenopathy, mass, or thyromegaly  LUNGS:  normal respiratory effort, and lungs clear to auscultation bilaterally - no rales, rhonchi or wheezes  CV: regular rate and rhythm, normal S1 S2, no S3 or S4, no murmur, click or rub and bilateral lower extremities without edema   ABD:  soft, mild diffuse tenderness to palpation without rebound/guarding, no mass, no hepatosplenomegaly, no hernias   :  Vulva is normal in appearance.  Bimanual exam reveals no cervical motion tenderness.  Uterus and adnexa are without mass or tenderness to palpation.   SKIN:  normal to inspection and palpation, no jaundice or rashes    Diagnostic Test Results:  AXR: copious stool throughout colon by my interpretation     Results for orders placed or performed in visit on 06/11/18   *UA reflex to Microscopic and Culture ([place] [facility] (except [place] and [place])   Result Value Ref Range    Color Urine Yellow     Appearance Urine Clear     Glucose Urine Negative NEG^Negative mg/dL    Bilirubin Urine Negative NEG^Negative    Ketones Urine Negative NEG^Negative mg/dL    Specific Gravity Urine 1.020 1.003 - 1.035    Blood Urine Negative NEG^Negative    pH Urine 5.5 5.0 - 7.0 pH    Protein Albumin Urine Negative NEG^Negative mg/dL    Urobilinogen Urine 0.2 0.2 - 1.0 EU/dL    Nitrite Urine Negative NEG^Negative    Leukocyte Esterase Urine Negative NEG^Negative    Source Midstream Urine         ASSESSMENT/PLAN:       1. Abdominal pain, left lower quadrant  Unclear etiology/diagnosis " with uncertain prognosis requiring further workup: UA and AXR done today.  DDx considered: diverticulitis, colitis, constipation, IBS, IBD, colon cancer, UTI, ureterolithiasis, MSK pain, zoster, hernia, ovarian pathology (torsion, abscess, cyst, mass).  Given findings on AXR, chronicity of symptoms, lack of fever, normal UA I think the most likely etiology is constipation and I'd like to treat that first.  Discussed that if symptoms persist despite regular, bowel habits then we will need to consider additional workup including potentially pelvic ultrasound, Abd/pelvic CT, or colonoscopy.    - XR Abdomen 2 Views; Future  - *UA reflex to Microscopic and Culture ([place] [facility] (except [place] and [place])    2. Constipation, unspecified constipation type  Discussed management of chronic constipation.  Discussed the importance of increasing fluid intake and physical activity.  Discussed use of Miralax (1 capful mixed in juice per day) which is safe to be used regularly - but to avoid regular use of stimulant laxatives.   - polyethylene glycol (MIRALAX) powder; Take 17 g (1 capful) by mouth daily  Dispense: 510 g; Refill: 11      Patient Instructions   Start the miralax.  Use it regularly to establish regular bowel habits.  If you feel that is successful but the pain is still present see me to discuss next steps.  If the pain improves with improvement of the constipation it was likely related to that.         Belgica Vogel MD  [facility]

## 2018-06-12 ASSESSMENT — ANXIETY QUESTIONNAIRES: GAD7 TOTAL SCORE: 0

## 2018-06-12 ASSESSMENT — PATIENT HEALTH QUESTIONNAIRE - PHQ9: SUM OF ALL RESPONSES TO PHQ QUESTIONS 1-9: 0

## 2018-07-07 ENCOUNTER — HOSPITAL ENCOUNTER (EMERGENCY)
Facility: CLINIC | Age: 43
Discharge: HOME | End: 2018-07-07
Attending: EMERGENCY MEDICINE | Admitting: EMERGENCY MEDICINE
Payer: COMMERCIAL

## 2018-07-07 ENCOUNTER — APPOINTMENT (OUTPATIENT)
Dept: ULTRASOUND IMAGING | Facility: CLINIC | Age: 43
End: 2018-07-07
Attending: EMERGENCY MEDICINE
Payer: COMMERCIAL

## 2018-07-07 VITALS
DIASTOLIC BLOOD PRESSURE: 86 MMHG | HEIGHT: 64 IN | SYSTOLIC BLOOD PRESSURE: 145 MMHG | BODY MASS INDEX: 24.75 KG/M2 | HEART RATE: 102 BPM | OXYGEN SATURATION: 98 % | WEIGHT: 145 LBS | TEMPERATURE: 98.5 F

## 2018-07-07 DIAGNOSIS — R10.13 DYSPEPSIA: ICD-10-CM

## 2018-07-07 DIAGNOSIS — R10.13 ABDOMINAL PAIN, EPIGASTRIC: ICD-10-CM

## 2018-07-07 LAB
ALBUMIN SERPL-MCNC: 4.3 G/DL (ref 3.4–5)
ALP SERPL-CCNC: 51 U/L (ref 40–150)
ALT SERPL W P-5'-P-CCNC: 15 U/L (ref 0–50)
ANION GAP SERPL CALCULATED.3IONS-SCNC: 9 MMOL/L (ref 3–14)
AST SERPL W P-5'-P-CCNC: 13 U/L (ref 0–45)
BASOPHILS # BLD AUTO: 0 10E9/L (ref 0–0.2)
BASOPHILS NFR BLD AUTO: 0.2 %
BILIRUB DIRECT SERPL-MCNC: <0.1 MG/DL (ref 0–0.2)
BILIRUB SERPL-MCNC: 0.4 MG/DL (ref 0.2–1.3)
BUN SERPL-MCNC: 8 MG/DL (ref 7–30)
CALCIUM SERPL-MCNC: 9.3 MG/DL (ref 8.5–10.1)
CHLORIDE SERPL-SCNC: 105 MMOL/L (ref 94–109)
CO2 SERPL-SCNC: 26 MMOL/L (ref 20–32)
CREAT SERPL-MCNC: 0.69 MG/DL (ref 0.52–1.04)
DIFFERENTIAL METHOD BLD: NORMAL
EOSINOPHIL # BLD AUTO: 0.2 10E9/L (ref 0–0.7)
EOSINOPHIL NFR BLD AUTO: 1.7 %
ERYTHROCYTE [DISTWIDTH] IN BLOOD BY AUTOMATED COUNT: 11.8 % (ref 10–15)
GFR SERPL CREATININE-BSD FRML MDRD: >90 ML/MIN/1.7M2
GLUCOSE SERPL-MCNC: 102 MG/DL (ref 70–99)
HCT VFR BLD AUTO: 41.5 % (ref 35–47)
HGB BLD-MCNC: 14.5 G/DL (ref 11.7–15.7)
IMM GRANULOCYTES # BLD: 0 10E9/L (ref 0–0.4)
IMM GRANULOCYTES NFR BLD: 0.2 %
LACTATE SERPL-SCNC: 1 MMOL/L (ref 0.4–2)
LIPASE SERPL-CCNC: 115 U/L (ref 73–393)
LYMPHOCYTES # BLD AUTO: 2.6 10E9/L (ref 0.8–5.3)
LYMPHOCYTES NFR BLD AUTO: 28.1 %
MCH RBC QN AUTO: 32.4 PG (ref 26.5–33)
MCHC RBC AUTO-ENTMCNC: 34.9 G/DL (ref 31.5–36.5)
MCV RBC AUTO: 93 FL (ref 78–100)
MONOCYTES # BLD AUTO: 0.6 10E9/L (ref 0–1.3)
MONOCYTES NFR BLD AUTO: 6.6 %
NEUTROPHILS # BLD AUTO: 5.9 10E9/L (ref 1.6–8.3)
NEUTROPHILS NFR BLD AUTO: 63.2 %
NRBC # BLD AUTO: 0 10*3/UL
NRBC BLD AUTO-RTO: 0 /100
PLATELET # BLD AUTO: 228 10E9/L (ref 150–450)
POTASSIUM SERPL-SCNC: 3.6 MMOL/L (ref 3.4–5.3)
PROT SERPL-MCNC: 7.7 G/DL (ref 6.8–8.8)
RBC # BLD AUTO: 4.48 10E12/L (ref 3.8–5.2)
SODIUM SERPL-SCNC: 140 MMOL/L (ref 133–144)
WBC # BLD AUTO: 9.3 10E9/L (ref 4–11)

## 2018-07-07 PROCEDURE — 85025 COMPLETE CBC W/AUTO DIFF WBC: CPT | Performed by: EMERGENCY MEDICINE

## 2018-07-07 PROCEDURE — 80048 BASIC METABOLIC PNL TOTAL CA: CPT | Performed by: EMERGENCY MEDICINE

## 2018-07-07 PROCEDURE — 25000128 H RX IP 250 OP 636: Performed by: EMERGENCY MEDICINE

## 2018-07-07 PROCEDURE — 83605 ASSAY OF LACTIC ACID: CPT | Performed by: EMERGENCY MEDICINE

## 2018-07-07 PROCEDURE — 96361 HYDRATE IV INFUSION ADD-ON: CPT

## 2018-07-07 PROCEDURE — 83690 ASSAY OF LIPASE: CPT | Performed by: EMERGENCY MEDICINE

## 2018-07-07 PROCEDURE — 80076 HEPATIC FUNCTION PANEL: CPT | Performed by: EMERGENCY MEDICINE

## 2018-07-07 PROCEDURE — 96374 THER/PROPH/DIAG INJ IV PUSH: CPT

## 2018-07-07 PROCEDURE — 99285 EMERGENCY DEPT VISIT HI MDM: CPT | Mod: 25

## 2018-07-07 PROCEDURE — 76700 US EXAM ABDOM COMPLETE: CPT

## 2018-07-07 RX ORDER — PANTOPRAZOLE SODIUM 40 MG/1
40 TABLET, DELAYED RELEASE ORAL DAILY
Qty: 30 TABLET | Refills: 0 | Status: SHIPPED | OUTPATIENT
Start: 2018-07-07 | End: 2018-08-06

## 2018-07-07 RX ORDER — HYDROMORPHONE HYDROCHLORIDE 1 MG/ML
0.5 INJECTION, SOLUTION INTRAMUSCULAR; INTRAVENOUS; SUBCUTANEOUS ONCE
Status: COMPLETED | OUTPATIENT
Start: 2018-07-07 | End: 2018-07-07

## 2018-07-07 RX ORDER — SUCRALFATE 1 G/1
1 TABLET ORAL 4 TIMES DAILY
COMMUNITY
End: 2018-10-01

## 2018-07-07 RX ORDER — HYOSCYAMINE SULFATE 0.125 MG
0.125-0.25 TABLET ORAL EVERY 4 HOURS PRN
Qty: 40 TABLET | Refills: 0 | Status: SHIPPED | OUTPATIENT
Start: 2018-07-07 | End: 2018-10-01

## 2018-07-07 RX ADMIN — Medication 0.5 MG: at 16:19

## 2018-07-07 RX ADMIN — SODIUM CHLORIDE 1000 ML: 9 INJECTION, SOLUTION INTRAVENOUS at 16:19

## 2018-07-07 ASSESSMENT — ENCOUNTER SYMPTOMS
ABDOMINAL PAIN: 1
BACK PAIN: 1
VOMITING: 0
DIAPHORESIS: 0
CHILLS: 0
FEVER: 0

## 2018-07-07 NOTE — ED AVS SNAPSHOT
Emergency Department    [address] [facility] [address]    Phone:  [phone]    Fax:  [phone]                                       Dorie Armendariz   MRN: [id number]    Department:   Emergency Department   Date of Visit:  7/7/2018           Patient Information     Date Of Birth          1975        Your diagnoses for this visit were:     Abdominal pain, epigastric     Dyspepsia        You were seen by Mikie Hess MD.      Follow-up Information     Schedule an appointment as soon as possible for a visit with Gilberto Cee MD.    Specialty:  Gastroenterology    Contact information:    [facility]  [address] [place] [address] [phone]          Follow up with  Emergency Department.    Specialty:  EMERGENCY MEDICINE    Why:  As needed, If symptoms worsen    Contact information:    [address] [facility] [id number] [phone]        Discharge Instructions         *Abdominal Pain, Unknown Cause (Female)    The exact cause of your abdominal (stomach) pain is not certain. This does not mean that this is something to worry about, or the right tests were not done. Everyone likes to know the exact cause of the problem, but sometimes with abdominal pain, there is no clear-cut cause, and this could be a good thing. The good news is that your symptoms can be treated, and you will feel better.   Your condition does not seem serious now; however, sometimes the signs of a serious problem may take more time to appear. For this reason, it is important for you to watch for any new symptoms, problems, or worsening of your condition.  Over the next few days, the abdominal pain may come and go, or be continuous. Other common symptoms can include nausea and vomiting. Sometimes it can be difficult to tell if you feel nauseous, you may just feel bad and not associate that feeling with nausea. Constipation, diarrhea, and a fever may go along with the  pain.  The pain may continue even if treated correctly over the following days. Depending on how things go, sometimes the cause can become clear and may require further or different treatment. Additional evaluations, medications, or tests may be needed.  Home care  Your health care provider may prescribe medications for pain, symptoms, or an infection.  Follow the health care provider's instructions for taking these medications.  General care    Rest until your next exam. No strenuous activities.    Try to find positions that ease discomfort. A small pillow placed on the abdomen may help relieve pain.    Something warm on your abdomen (such as a heating pad) may help, but be careful not to burn yourself.  Diet    Do not force yourself to eat, especially if having cramps, vomiting, or diarrhea.    Water is important so you do not get dehydrated. Soup may also be good. Sports drinks may also help, especially if they are not too acidic. Make sure you don't drink sugary drinks as this can make things worse. Take liquids in small amounts. Do not guzzle them.    Caffeine sometimes makes the pain and cramping worse.    Avoid dairy products if you have vomiting or diarrhea.    Don't eat large amounts at a time. Wait a few minutes between bites.    Eat a diet low in fiber (called a low-residue diet). Foods allowed include refined breads, white rice, fruit and vegetable juices without pulp, tender meats. These foods will pass more easily through the intestine.    Avoid fried or fatty foods, dairy, alcohol and spicy foods until your symptoms go away.  Follow-up care  Follow up with your health care provider as instructed, or if your pain does not begin to improve in the next 24 hours.  When to seek medical care  Seek prompt medical care if any of the following occur:    Pain gets worse or moves to the right lower abdomen    New or worsening vomiting or diarrhea    Swelling of the abdomen    Unable to pass stool for more than  three days    New fever over 101  F (38.3 C), or rising fever    Blood in vomit or bowel movements (dark red or black color)    Jaundice (yellow color of eyes and skin)    Weakness, dizziness    Chest, arm, back, neck or jaw pain    Unexpected vaginal bleeding or missed period  Call 911  Call emergency services if any of the following occur:    Trouble breathing    Confusion    Fainting or loss of consciousness    Rapid heart rate    Seizure    [id number] [facility], [address] [place] [address], [place] [address]. All rights reserved. This information is not intended as a substitute for professional medical care. Always follow your healthcare professional's instructions.      The gastroenterology  will call you likely Monday or Tuesday to schedule an upper GI endoscopy.        Discharge References/Attachments     EPIGASTRIC PAIN (UNCERTAIN CAUSE) (ENGLISH)    FUNCTIONAL DYSPEPSIA, UNDERSTANDING (ENGLISH)    PEPTIC ULCER DISEASE (ALL CAUSES) (ENGLISH)    HELICOBACTER PYLORI ANTIBODY (ENGLISH)    H. PYLORI INFECTION WITH PEPTIC ULCER (ENGLISH)    H. PYLORI AND ULCERS, UNDERSTANDING (ENGLISH)    GASTRITIS OR ULCER (NO ANTIBIOTIC TREATMENT) (ENGLISH)    GASTRIC ULCERS, UNDERSTANDING (ENGLISH)    ULCER, PEPTIC (ENGLISH)      Your next 10 appointments already scheduled     Jul 12, 2018  3:20 PM CDT   MyChart Short with Belgica Vogel MD   [facility] ([facility])    [facility] [place] [address] [phone]              24 Hour Appointment Hotline       To make an appointment at any [facility], call [phone] ([phone]). If you don't have a family doctor or clinic, we will help you find one. [facility] are conveniently located to serve the needs of you and your family.          ED Discharge Orders     GASTROENTEROLOGY ADULT REF PROCEDURE ONLY [facility] [phone]; Dr. FRANKLIN Cee       Last Lab Result: Creatinine (mg/dL)        Date                     Value                 07/07/2018               0.69             ----------  Body mass index is 24.89 kg/(m^2).     Needed:  No  Language:  English    Patient will be contacted to schedule procedure.     Please be aware that coverage of these services is subject to the terms and limitations of your health insurance plan.  Call member services at your health plan with any benefit or coverage questions.  Any procedures must be performed at a [facility] facility OR coordinated by your clinic's referral office.    Please bring the following with you to your appointment:    (1) Any X-Rays, CTs or MRIs which have been performed.  Contact the facility where they were done to arrange for  prior to your scheduled appointment.    (2) List of current medications   (3) This referral request   (4) Any documents/labs given to you for this referral                     Review of your medicines      START taking        Dose / Directions Last dose taken    hyoscyamine 0.125 MG tablet   Commonly known as:  ANASPAZ/LEVSIN   Dose:  0.125-0.25 mg   Quantity:  40 tablet        Take 1-2 tablets (125-250 mcg) by mouth every 4 hours as needed for cramping   Refills:  0        pantoprazole 40 MG EC tablet   Commonly known as:  PROTONIX   Dose:  40 mg   Quantity:  30 tablet        Take 1 tablet (40 mg) by mouth daily for 30 doses   Refills:  0          Our records show that you are taking the medicines listed below. If these are incorrect, please call your family doctor or clinic.        Dose / Directions Last dose taken    ALPRAZolam 0.25 MG tablet   Commonly known as:  XANAX   Dose:  0.25 mg   Quantity:  20 tablet        Take 1 tablet (0.25 mg) by mouth 3 times daily as needed for anxiety   Refills:  0        losartan 100 MG tablet   Commonly known as:  COZAAR   Dose:  100 mg   Quantity:  90 tablet        Take 1 tablet (100 mg) by mouth daily   Refills:  2        polyethylene glycol powder   Commonly  known as:  MIRALAX   Dose:  1 capful   Quantity:  510 g        Take 17 g (1 capful) by mouth daily   Refills:  11        sucralfate 1 GM tablet   Commonly known as:  CARAFATE   Dose:  1 g        Take 1 g by mouth 4 times daily   Refills:  0                Prescriptions were sent or printed at these locations (2 Prescriptions)                   Other Prescriptions                Printed at Department/Unit printer (2 of 2)         hyoscyamine (ANASPAZ/LEVSIN) 0.125 MG tablet               pantoprazole (PROTONIX) 40 MG EC tablet                Procedures and tests performed during your visit     Abdomen US, complete    Basic metabolic panel    CBC with platelets differential    Hepatic panel    Lactic acid    Lipase      Orders Needing Specimen Collection     None      Pending Results     Date and Time Order Name Status Description    7/7/2018 1558 Abdomen US, complete Preliminary             Pending Culture Results     No orders found from 7/5/2018 to 7/8/2018.            Pending Results Instructions     If you had any lab results that were not finalized at the time of your Discharge, you can call the ED Lab Result RN at [phone]. You will be contacted by this team for any positive Lab results or changes in treatment. The nurses are available 7 days a week from 10A to 6:30P.  You can leave a message 24 hours per day and they will return your call.        Test Results From Your Hospital Stay        7/7/2018  4:26 PM      Component Results     Component Value Ref Range & Units Status    WBC 9.3 4.0 - 11.0 10e9/L Final    RBC Count 4.48 3.8 - 5.2 10e12/L Final    Hemoglobin 14.5 11.7 - 15.7 g/dL Final    Hematocrit 41.5 35.0 - 47.0 % Final    MCV 93 78 - 100 fl Final    MCH 32.4 26.5 - 33.0 pg Final    MCHC 34.9 31.5 - 36.5 g/dL Final    RDW 11.8 10.0 - 15.0 % Final    Platelet Count 228 150 - 450 10e9/L Final    Diff Method Automated Method  Final    % Neutrophils 63.2 % Final    % Lymphocytes 28.1 % Final    %  Monocytes 6.6 % Final    % Eosinophils 1.7 % Final    % Basophils 0.2 % Final    % Immature Granulocytes 0.2 % Final    Nucleated RBCs 0 0 /100 Final    Absolute Neutrophil 5.9 1.6 - 8.3 10e9/L Final    Absolute Lymphocytes 2.6 0.8 - 5.3 10e9/L Final    Absolute Monocytes 0.6 0.0 - 1.3 10e9/L Final    Absolute Eosinophils 0.2 0.0 - 0.7 10e9/L Final    Absolute Basophils 0.0 0.0 - 0.2 10e9/L Final    Abs Immature Granulocytes 0.0 0 - 0.4 10e9/L Final    Absolute Nucleated RBC 0.0  Final         7/7/2018  4:50 PM      Component Results     Component Value Ref Range & Units Status    Sodium 140 133 - 144 mmol/L Final    Potassium 3.6 3.4 - 5.3 mmol/L Final    Chloride 105 94 - 109 mmol/L Final    Carbon Dioxide 26 20 - 32 mmol/L Final    Anion Gap 9 3 - 14 mmol/L Final    Glucose 102 (H) 70 - 99 mg/dL Final    Urea Nitrogen 8 7 - 30 mg/dL Final    Creatinine 0.69 0.52 - 1.04 mg/dL Final    GFR Estimate >90 >60 mL/min/1.7m2 Final    Non  GFR Calc    GFR Estimate If Black >90 >60 mL/min/1.7m2 Final    African American GFR Calc    Calcium 9.3 8.5 - 10.1 mg/dL Final         7/7/2018  4:53 PM      Component Results     Component Value Ref Range & Units Status    Bilirubin Direct <0.1 0.0 - 0.2 mg/dL Final    Bilirubin Total 0.4 0.2 - 1.3 mg/dL Final    Albumin 4.3 3.4 - 5.0 g/dL Final    Protein Total 7.7 6.8 - 8.8 g/dL Final    Alkaline Phosphatase 51 40 - 150 U/L Final    ALT 15 0 - 50 U/L Final    AST 13 0 - 45 U/L Final         7/7/2018  4:33 PM      Component Results     Component Value Ref Range & Units Status    Lactic Acid 1.0 0.4 - 2.0 mmol/L Final         7/7/2018  4:50 PM      Component Results     Component Value Ref Range & Units Status    Lipase 115 73 - 393 U/L Final         7/7/2018  4:53 PM      Narrative     ABDOMINAL ULTRASOUND  7/7/2018 4:47 PM    HISTORY:  Epigastric pain.  Pancreatitis.  Gallstones. Hydro. Pain for  7 days in upper abdomen and left upper quadrant.  CT two days  ago  negative at outside hospital. Prior history of appendectomy.    COMPARISON: Abdominal x-rays dated 6/11/2018. Prior CT abdomen from  outside hospital, reportedly done two days ago, is not available for  comparison.    FINDINGS:    Gallbladder: Normal with no cholelithiasis, wall thickening or focal  tenderness.      Bile ducts:  CHD is normal diameter.  No intrahepatic biliary  dilatation.    Liver: Normal.     Pancreas:  Partially obscured but grossly unremarkable, where seen.     Spleen:  Normal.     Right kidney:  Normal.     Left kidney:  Normal.    Aorta and Proximal IVC:  Normal.      No abnormal fluid collections are seen in the scanned portions of the  abdomen.        Impression     IMPRESSION:  Negative abdominal ultrasound. No etiology for patient's  abdominal pain is identified. No cholelithiasis or evidence for acute  cholecystitis.                  Clinical Quality Measure: Blood Pressure Screening     Your blood pressure was checked while you were in the emergency department today. The last reading we obtained was  BP: 145/86 . Please read the guidelines below about what these numbers mean and what you should do about them.  If your systolic blood pressure (the top number) is less than 120 and your diastolic blood pressure (the bottom number) is less than 80, then your blood pressure is normal. There is nothing more that you need to do about it.  If your systolic blood pressure (the top number) is 120-139 or your diastolic blood pressure (the bottom number) is 80-89, your blood pressure may be higher than it should be. You should have your blood pressure rechecked within a year by a primary care provider.  If your systolic blood pressure (the top number) is 140 or greater or your diastolic blood pressure (the bottom number) is 90 or greater, you may have high blood pressure. High blood pressure is treatable, but if left untreated over time it can put you at risk for heart attack, stroke, or kidney  failure. You should have your blood pressure rechecked by a primary care provider within the next 4 weeks.  If your provider in the emergency department today gave you specific instructions to follow-up with your doctor or provider even sooner than that, you should follow that instruction and not wait for up to 4 weeks for your follow-up visit.        Thank you for choosing [facility]       Thank you for choosing [facility] for your care. Our goal is always to provide you with excellent care. Hearing back from our patients is one way we can continue to improve our services. Please take a few minutes to complete the written survey that you may receive in the mail after you visit with us. Thank you!        [employer] Information     [employer] gives you secure access to your electronic health record. If you see a primary care provider, you can also send messages to your care team and make appointments. If you have questions, please call your primary care clinic.  If you do not have a primary care provider, please call [phone] and they will assist you.        Care EveryWhere ID     This is your Care EveryWhere ID. This could be used by other organizations to access your [facility] medical records  [id number]        Equal Access to Services     RODO WINKLER : Hadjoselin Bonilla, thad david, leena sheehan. So [facility] [phone].    ATENCIÓN: Si habla español, tiene a de leon disposición servicios gratuitos de asistencia lingüística. Jordyn al [phone].    We comply with applicable federal civil rights laws and [place] laws. We do not discriminate on the basis of race, color, national origin, age, disability, sex, sexual orientation, or gender identity.            After Visit Summary       This is your record. Keep this with you and show to your community pharmacist(s) and doctor(s) at your next visit.

## 2018-07-07 NOTE — ED AVS SNAPSHOT
Emergency Department    [address] [place] [address]    Phone:  [phone]    Fax:  [phone]                                       Dorie McCartan   MRN: [id number]    Department:   Emergency Department   Date of Visit:  7/7/2018           After Visit Summary Signature Page     I have received my discharge instructions, and my questions have been answered. I have discussed any challenges I see with this plan with the nurse or doctor.    ..........................................................................................................................................  Patient/Patient Representative Signature      ..........................................................................................................................................  Patient Representative Print Name and Relationship to Patient    ..................................................               ................................................  Date                                            Time    ..........................................................................................................................................  Reviewed by Signature/Title    ...................................................              ..............................................  Date                                                            Time

## 2018-07-07 NOTE — ED PROVIDER NOTES
"  History     Chief Complaint:  Abdominal Pain       HPI   Dorie Armendariz is a 42 year old female with a history of hypertension, IBS, and multiple abdominal surgeries who presents to the emergency department with a friend for evaluation of abdominal pain. The patient reports that one week ago she had central and left upper quadrant pain which has continued to get worse and at most lessens but has not resolved. The pain has radiated slightly into her back. She notes she does have a history of \"digestive issues\" but this pain is different than heart burn or her normal pain. The patient was recently visiting her parents in [place] and went to the ED there three days ago. She had blood work done which was unremarkable, an abdominal CT which did not show bowel obstruction or diverticulitis (it was negative) and was told to follow up with her PCP.  The patient has been taking the Prilosec and Carafate daily without improvement of symptoms, in fact, the symptoms appear to be worsening.  There is a history of irritable bowel syndrome noted in the record.  The pain does not change with food intake. She reports using aspirin once a week at most. The patient has had multiple abdominal surgeries including an appendectomy, hysterectomy, and a fibroidectomy. She has been hospitalized in the past for Hepatitis A, likely obtained after eating a [place] restaurant.  The patient denies fevers, chills, sweats, vomiting, or a history of gallstones.     Allergies:  Sulfa Drugs     Medications:    Xanax  Cozaar  Carafate  Prilosec  Miralax    Past Medical History:    Anxiety  Hypertension  IBS  Major depression  Migraine  Ovarian cyst  Uterine fibroid  Viral hepatitis  Diffuse photo damage of skin    Past Surgical History:    Appendectomy  Laparoscopic hysterectomy  Laparotomy exploratory  Fibroid removal    Family History:    Hypertension  Prostate cancer  CAD: father  Lipids    Social History:  Negative for tobacco use.  Positive for " "alcohol use.  Marital Status:  Single      Review of Systems   Constitutional: Negative for chills, diaphoresis and fever.   Gastrointestinal: Positive for abdominal pain. Negative for vomiting.   Musculoskeletal: Positive for back pain.   All other systems reviewed and are negative.    Physical Exam   First Vitals:  BP: (!) 157/96  Pulse: 102  Temp: 98.5  F (36.9  C)  Height: 162.6 cm (5' 4\")  Weight: 65.8 kg (145 lb)  SpO2: 100 %      Physical Exam    General: Resting uncomfortably on the gurney, mildly tearful  Head:  The scalp, face, and head appear normal  Eyes:  The pupils are equal, round, and reactive to light    There is no nystagmus    Extraocular muscles are intact    Conjunctivae and sclerae are normal  ENT:    The nose is normal    Pinnae are normal    The oropharynx is normal    Uvula is in the midline  Neck:  Normal range of motion    There is no rigidity noted    There is no midline cervical spine pain/tenderness    Trachea is in the midline    No mass is detected  CV:  Regular rate and underlying rhythm     Normal S1/S2, no S3/S4    No pathological murmur detected  Resp:  Lungs are clear    There is no tachypnea    Non-labored    No rales    No wheezing   GI:  Abdomen is soft, there is no rigidity    Individual Abdominal Quadrant Assessment:    RUQ:    Normal    Epigastrium:  There is marked tenderness in this location    LUQ:   Mild tenderness is noted    Periumbilical: There is mild discomfort with palpation    RLQ:   Normal    Suprapubic:  Normal    LLQ:   Normal    No distension    No tympani    No rebound tenderness     Non-surgical without peritoneal features  MS:  Normal muscular tone    Symmetric motor strength    No major joint effusions    No asymmetric leg swelling, no calf tenderness  Skin:  No rash or acute skin lesions noted  Neuro: Speech is normal and fluent  Psych:  Awake. Alert.      Normal affect.  Appropriate interactions.  Lymph: No anterior cervical lymphadenopathy " noted      Emergency Department Course   Imaging:  US Abdomen, Complete:  IMPRESSION:  Negative abdominal ultrasound. No etiology for patient's  abdominal pain is identified. No cholelithiasis or evidence for acute  cholecystitis.    Reading per radiology.     Radiographic findings were communicated with the patient who voiced understanding of the findings.    Laboratory:  CBC: WBC: 9.3, HGB: 14.5, PLT: 228  BMP: Glucose 102 (H), o/w WNL (Creatinine: 0.69)    Hepatic Panel:   Lab Results   Component Value Date    AST 13 07/07/2018     Lab Results   Component Value Date    ALT 15 07/07/2018     No results found for: BILICONJ   Lab Results   Component Value Date    BILITOTAL 0.4 07/07/2018     Lab Results   Component Value Date    ALBUMIN 4.3 07/07/2018     Lab Results   Component Value Date    PROTTOTAL 7.7 07/07/2018      Lab Results   Component Value Date    ALKPHOS 51 07/07/2018       1633 Lactic Acid: 1.0  Lipase: 115    Interventions:  1619 0.9% Sodium Chloride BOLUS 1000 mLs IV injection  1619 Dilaudid 0.5 mg IV injection    Emergency Department Course:  1545 Nursing notes and vitals reviewed. I performed an exam of the patient as documented above.     IV inserted. Medicine administered as documented above. Blood drawn. This was sent to the lab for further testing, results above.    The patient was sent for an abdomen US while in the emergency department, findings above.     1649 I rechecked the patient and discussed the results of her workup thus far. Her pain level is much  improved.     Findings and plan explained to the Patient. Patient discharged home with instructions regarding supportive care, medications, and reasons to return. The importance of close follow-up was reviewed. The patient was prescribed Levsin and Protonix.    I personally reviewed the laboratory results with the Patient and answered all related questions prior to discharge.       Impression & Plan    Medical Decision Making:  This  patient presents with an episode of epigastric and left upper quadrant abdominal pain that has been present for approximately 7 days.  There is a history of multiple abdominal surgeries and irritable bowel syndrome.  The patient had CT scan of the abdomen this week which was negative.  Blood work 3 days ago and today are within normal limits.  There is no evidence of hepatitis, pancreatitis, or ischemia involving the intestine/mesentery.  There is no symptoms of significant gastroenteritis or colitis.  Patient's symptoms are squarely located in the epigastric location in the region of the stomach and duodenum.  The spleen and pancreas appear normal on ultrasound.  She has not yet seen improvement from proton pump inhibition and Carafate binding agent.  She will be switched to a different PPI at this juncture.  Given the negative ultrasound and negative CT scan the patient will likely benefit diagnostically from EGD.  This will be scheduled through the gastroenterology .  I will start some Levsin as well given her history of irritable bowel syndrome and the crampy pains that she is having.  Other life-threatening etiologies have been considered, and are felt to be less likely.      Diagnosis:    ICD-10-CM    1. Abdominal pain, epigastric R10.13 GASTROENTEROLOGY ADULT REF PROCEDURE ONLY [facility] [phone]; Dr. FRANKLIN Cee   2. Dyspepsia R10.13 GASTROENTEROLOGY ADULT REF PROCEDURE ONLY [facility]  [phone]; Dr. FRANKLIN Cee       Disposition:  Discharged to home.    Discharge Medications:  New Prescriptions    HYOSCYAMINE (ANASPAZ/LEVSIN) 0.125 MG TABLET    Take 1-2 tablets (125-250 mcg) by mouth every 4 hours as needed for cramping    PANTOPRAZOLE (PROTONIX) 40 MG EC TABLET    Take 1 tablet (40 mg) by mouth daily for 30 doses     Scribe Disclosure:  Pelon TERRELL, am serving as a scribe on 7/7/2018 at 3:45 PM to personally document services performed by Dr. Deshawn MD based on my  observations and the provider's statements to me.       Pelon Diaz  7/7/2018    EMERGENCY DEPARTMENT       Mikie Hess MD  07/07/18 [id number]

## 2018-07-07 NOTE — DISCHARGE INSTRUCTIONS
*Abdominal Pain, Unknown Cause (Female)    The exact cause of your abdominal (stomach) pain is not certain. This does not mean that this is something to worry about, or the right tests were not done. Everyone likes to know the exact cause of the problem, but sometimes with abdominal pain, there is no clear-cut cause, and this could be a good thing. The good news is that your symptoms can be treated, and you will feel better.   Your condition does not seem serious now; however, sometimes the signs of a serious problem may take more time to appear. For this reason, it is important for you to watch for any new symptoms, problems, or worsening of your condition.  Over the next few days, the abdominal pain may come and go, or be continuous. Other common symptoms can include nausea and vomiting. Sometimes it can be difficult to tell if you feel nauseous, you may just feel bad and not associate that feeling with nausea. Constipation, diarrhea, and a fever may go along with the pain.  The pain may continue even if treated correctly over the following days. Depending on how things go, sometimes the cause can become clear and may require further or different treatment. Additional evaluations, medications, or tests may be needed.  Home care  Your health care provider may prescribe medications for pain, symptoms, or an infection.  Follow the health care provider's instructions for taking these medications.  General care    Rest until your next exam. No strenuous activities.    Try to find positions that ease discomfort. A small pillow placed on the abdomen may help relieve pain.    Something warm on your abdomen (such as a heating pad) may help, but be careful not to burn yourself.  Diet    Do not force yourself to eat, especially if having cramps, vomiting, or diarrhea.    Water is important so you do not get dehydrated. Soup may also be good. Sports drinks may also help, especially if they are not too acidic. Make sure you  don't drink sugary drinks as this can make things worse. Take liquids in small amounts. Do not guzzle them.    Caffeine sometimes makes the pain and cramping worse.    Avoid dairy products if you have vomiting or diarrhea.    Don't eat large amounts at a time. Wait a few minutes between bites.    Eat a diet low in fiber (called a low-residue diet). Foods allowed include refined breads, white rice, fruit and vegetable juices without pulp, tender meats. These foods will pass more easily through the intestine.    Avoid fried or fatty foods, dairy, alcohol and spicy foods until your symptoms go away.  Follow-up care  Follow up with your health care provider as instructed, or if your pain does not begin to improve in the next 24 hours.  When to seek medical care  Seek prompt medical care if any of the following occur:    Pain gets worse or moves to the right lower abdomen    New or worsening vomiting or diarrhea    Swelling of the abdomen    Unable to pass stool for more than three days    New fever over 101  F (38.3 C), or rising fever    Blood in vomit or bowel movements (dark red or black color)    Jaundice (yellow color of eyes and skin)    Weakness, dizziness    Chest, arm, back, neck or jaw pain    Unexpected vaginal bleeding or missed period  Call 911  Call emergency services if any of the following occur:    Trouble breathing    Confusion    Fainting or loss of consciousness    Rapid heart rate    Seizure    [id number] [facility], [address] [place] [address], [place] [address]. All rights reserved. This information is not intended as a substitute for professional medical care. Always follow your healthcare professional's instructions.      The gastroenterology  will call you likely Monday or Tuesday to schedule an upper GI endoscopy.

## 2018-07-12 ENCOUNTER — SURGERY (OUTPATIENT)
Age: 43
End: 2018-07-12

## 2018-07-12 ENCOUNTER — HOSPITAL ENCOUNTER (OUTPATIENT)
Facility: CLINIC | Age: 43
Discharge: HOME | End: 2018-07-12
Attending: INTERNAL MEDICINE | Admitting: INTERNAL MEDICINE
Payer: COMMERCIAL

## 2018-07-12 VITALS
DIASTOLIC BLOOD PRESSURE: 89 MMHG | OXYGEN SATURATION: 96 % | BODY MASS INDEX: 24.75 KG/M2 | SYSTOLIC BLOOD PRESSURE: 127 MMHG | RESPIRATION RATE: 14 BRPM | WEIGHT: 145 LBS | HEIGHT: 64 IN

## 2018-07-12 LAB — UPPER GI ENDOSCOPY: NORMAL

## 2018-07-12 PROCEDURE — 40000556 ZZH STATISTIC PERIPHERAL IV START W US GUIDANCE

## 2018-07-12 PROCEDURE — 25000128 H RX IP 250 OP 636: Performed by: INTERNAL MEDICINE

## 2018-07-12 PROCEDURE — G0500 MOD SEDAT ENDO SERVICE >5YRS: HCPCS | Performed by: INTERNAL MEDICINE

## 2018-07-12 PROCEDURE — 40000257 ZZH STATISTIC CONSULT NO CHARGE VASC ACCESS

## 2018-07-12 PROCEDURE — 88305 TISSUE EXAM BY PATHOLOGIST: CPT | Mod: 26 | Performed by: INTERNAL MEDICINE

## 2018-07-12 PROCEDURE — 43239 EGD BIOPSY SINGLE/MULTIPLE: CPT | Performed by: INTERNAL MEDICINE

## 2018-07-12 PROCEDURE — 25000125 ZZHC RX 250: Performed by: INTERNAL MEDICINE

## 2018-07-12 PROCEDURE — 88305 TISSUE EXAM BY PATHOLOGIST: CPT | Performed by: INTERNAL MEDICINE

## 2018-07-12 RX ORDER — FENTANYL CITRATE 50 UG/ML
INJECTION, SOLUTION INTRAMUSCULAR; INTRAVENOUS PRN
Status: DISCONTINUED | OUTPATIENT
Start: 2018-07-12 | End: 2018-07-12 | Stop reason: HOSPADM

## 2018-07-12 RX ORDER — LIDOCAINE 40 MG/G
CREAM TOPICAL
Status: DISCONTINUED | OUTPATIENT
Start: 2018-07-12 | End: 2018-07-12 | Stop reason: HOSPADM

## 2018-07-12 RX ORDER — ONDANSETRON 2 MG/ML
4 INJECTION INTRAMUSCULAR; INTRAVENOUS
Status: DISCONTINUED | OUTPATIENT
Start: 2018-07-12 | End: 2018-07-12 | Stop reason: HOSPADM

## 2018-07-12 RX ADMIN — FENTANYL CITRATE 100 MCG: 50 INJECTION, SOLUTION INTRAMUSCULAR; INTRAVENOUS at 11:06

## 2018-07-12 RX ADMIN — TOPICAL ANESTHETIC 2 SPRAY: 200 SPRAY DENTAL; PERIODONTAL at 11:08

## 2018-07-12 RX ADMIN — MIDAZOLAM 1 MG: 1 INJECTION INTRAMUSCULAR; INTRAVENOUS at 11:14

## 2018-07-12 RX ADMIN — MIDAZOLAM 2 MG: 1 INJECTION INTRAMUSCULAR; INTRAVENOUS at 11:07

## 2018-07-12 RX ADMIN — MIDAZOLAM 2 MG: 1 INJECTION INTRAMUSCULAR; INTRAVENOUS at 11:09

## 2018-07-13 LAB — COPATH REPORT: NORMAL

## 2018-09-29 ENCOUNTER — APPOINTMENT (OUTPATIENT)
Dept: CT IMAGING | Facility: CLINIC | Age: 43
End: 2018-09-29
Attending: NURSE PRACTITIONER
Payer: COMMERCIAL

## 2018-09-29 ENCOUNTER — HOSPITAL ENCOUNTER (EMERGENCY)
Facility: CLINIC | Age: 43
Discharge: HOME | End: 2018-09-29
Attending: NURSE PRACTITIONER | Admitting: NURSE PRACTITIONER
Payer: COMMERCIAL

## 2018-09-29 VITALS
HEIGHT: 64 IN | RESPIRATION RATE: 16 BRPM | DIASTOLIC BLOOD PRESSURE: 104 MMHG | BODY MASS INDEX: 25.61 KG/M2 | SYSTOLIC BLOOD PRESSURE: 164 MMHG | WEIGHT: 150 LBS | TEMPERATURE: 99.7 F | OXYGEN SATURATION: 100 % | HEART RATE: 92 BPM

## 2018-09-29 DIAGNOSIS — S09.90XA CLOSED HEAD INJURY, INITIAL ENCOUNTER: ICD-10-CM

## 2018-09-29 DIAGNOSIS — F41.9 ANXIETY: ICD-10-CM

## 2018-09-29 PROCEDURE — 90791 PSYCH DIAGNOSTIC EVALUATION: CPT

## 2018-09-29 PROCEDURE — 99285 EMERGENCY DEPT VISIT HI MDM: CPT | Mod: 25

## 2018-09-29 PROCEDURE — 70450 CT HEAD/BRAIN W/O DYE: CPT

## 2018-09-29 PROCEDURE — 25000131 ZZH RX MED GY IP 250 OP 636 PS 637: Performed by: NURSE PRACTITIONER

## 2018-09-29 PROCEDURE — 25000132 ZZH RX MED GY IP 250 OP 250 PS 637: Performed by: NURSE PRACTITIONER

## 2018-09-29 RX ORDER — ONDANSETRON 4 MG/1
4 TABLET, ORALLY DISINTEGRATING ORAL ONCE
Status: COMPLETED | OUTPATIENT
Start: 2018-09-29 | End: 2018-09-29

## 2018-09-29 RX ORDER — ACETAMINOPHEN 325 MG/1
975 TABLET ORAL EVERY 4 HOURS PRN
Status: DISCONTINUED | OUTPATIENT
Start: 2018-09-29 | End: 2018-09-29 | Stop reason: HOSPADM

## 2018-09-29 RX ADMIN — ONDANSETRON 4 MG: 4 TABLET, ORALLY DISINTEGRATING ORAL at 14:52

## 2018-09-29 RX ADMIN — ACETAMINOPHEN 975 MG: 325 TABLET, FILM COATED ORAL at 14:52

## 2018-09-29 ASSESSMENT — ENCOUNTER SYMPTOMS
NAUSEA: 1
HEADACHES: 1
VOMITING: 0
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
## 2018-09-29 NOTE — DISCHARGE INSTRUCTIONS
Discharge Instructions  Head Injury    You have been seen today for a head injury. Your evaluation included a history and physical examination. You may have had a CT (CAT) scan performed, though most head injuries do not require a scan. Based on this evaluation, your provider today does not feel that your head injury is serious.    Generally, every Emergency Department visit should have a follow-up clinic visit with either a primary or a specialty clinic/provider. Please follow-up as instructed by your emergency provider today.  Return to the Emergency Department if:    You are confused or you are not acting right.    Your headache gets worse or you start to have a really bad headache even with your recommended treatment plan.    You vomit (throw up) more than once.    You have a seizure.    You have trouble walking.    You have weakness or paralysis (cannot move) in an arm or a leg.    You have blood or fluid coming from your ears or nose.    You have new symptoms or anything that worries you.    Sleeping:  It is okay for you to sleep, but someone should wake you up if instructed by your provider, and someone should check on you at your usual time to wake up.     Activity:    Do not drive for at least 24 hours.    Do not drive if you have dizzy spells or trouble concentrating, or remembering things.    Do not return to any contact sports until cleared by your regular provider.     MORE INFORMATION:    Concussion:  A concussion is a minor head injury that may cause temporary problems with the way the brain works. Although concussions are important, they are generally not an emergency or a reason that a person needs to be hospitalized. Some concussion symptoms include confusion, amnesia (forgetful), nausea (sick to your stomach) and vomiting (throwing up), dizziness, fatigue, memory or concentration problems, irritability and sleep problems. For most people, concussions are mild and temporary but some will have more  severe and persistent symptoms that require on-going care and treatment.  CT Scans: Your evaluation today may have included a CT scan (CAT scan) to look for things like bleeding or a skull fracture (broken bone).  CT scans involve radiation and too many CT scans can cause serious health problems like cancer, especially in children.  Because of this, your provider may not have ordered a CT scan today if they think you are at low risk for a serious or life threatening problem.    If you were given a prescription for medicine here today, be sure to read all of the information (including the package insert) that comes with your prescription.  This will include important information about the medicine, its side effects, and any warnings that you need to know about.  The pharmacist who fills the prescription can provide more information and answer questions you may have about the medicine.  If you have questions or concerns that the pharmacist cannot address, please call or return to the Emergency Department.     Remember that you can always come back to the Emergency Department if you are not able to see your regular provider in the amount of time listed above, if you get any new symptoms, or if there is anything that worries you.    Discharge Instructions  Mental Health Concerns    You were seen today for mental health concerns, such as depression, anxiety, or suicidal thinking. Your provider feels that you do not require hospitalization at this time. However, your symptoms may become worse, and you may need to return to the Emergency Department. Most treatments of depression and suicidal thoughts are a process rather than a single intervention.  Medications and counseling can take several weeks or more to help.    Generally, every Emergency Department visit should have a follow-up clinic visit with either a primary or a specialty clinic/provider. Please follow-up as instructed by your emergency provider today.    By  accepting these discharge instructions:    You promise to not harm yourself or others.    You agree that if you feel you are becoming unable to keep that promise, you will do something to help yourself before you do anything to harm yourself or others.     You agree to keep any safety plan arranged on your visit here today.    You agree to take any medication prescribed or recommended by your provider.    If you are getting worse, you can contact a friend or a family member, contact your counselor or family provider, contact a crisis line, or other options discussed with the provider or therapist today.    At any time, you can call 911 and return to the Emergency Department for more help.    You understand that follow-up is essential to your treatment, and you will make and keep appointments recommended on your visit today.    How to improve your mental health and prevent suicide:    Involve others by letting family, friends, counselors know.  Do not isolate yourself.    Avoid alcohol or drugs. Remove weapons, poisons from your home.    Try to stick to routines for eating, sleeping and getting regular exercise.      Try to get into sunlight. Bright natural light not only treats seasonal affective disorder but also depression.    Increase safe activities that you enjoy.    If you feel worse, contact [phone] ([phone]), or call 911, or your primary provider/counselor for additional assistance.    If you were given a prescription for medicine here today, be sure to read all of the information (including the package insert) that comes with your prescription.  This will include important information about the medicine, its side effects, and any warnings that you need to know about.  The pharmacist who fills the prescription can provide more information and answer questions you may have about the medicine.  If you have questions or concerns that the pharmacist cannot address, please call or return to the  Emergency Department.   Remember that you can always come back to the Emergency Department if you are not able to see your regular provider in the amount of time listed above, if you get any new symptoms, or if there is anything that worries you.
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
## 2018-09-29 NOTE — ED AVS SNAPSHOT
Emergency Department    [address] [place] [address]    Phone:  [phone]    Fax:  [phone]                                       Dorie McCartan   MRN: [id number]    Department:   Emergency Department   Date of Visit:  9/29/2018           After Visit Summary Signature Page     I have received my discharge instructions, and my questions have been answered. I have discussed any challenges I see with this plan with the nurse or doctor.    ..........................................................................................................................................  Patient/Patient Representative Signature      ..........................................................................................................................................  Patient Representative Print Name and Relationship to Patient    ..................................................               ................................................  Date                                   Time    ..........................................................................................................................................  Reviewed by Signature/Title    ...................................................              ..............................................  Date                                               Time          22EPIC Rev 08/18
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
## 2018-10-01 ENCOUNTER — OFFICE VISIT (OUTPATIENT)
Dept: FAMILY MEDICINE | Facility: CLINIC | Age: 43
End: 2018-10-01
Payer: COMMERCIAL

## 2018-10-01 VITALS
SYSTOLIC BLOOD PRESSURE: 134 MMHG | HEIGHT: 64 IN | RESPIRATION RATE: 16 BRPM | DIASTOLIC BLOOD PRESSURE: 89 MMHG | HEART RATE: 84 BPM | TEMPERATURE: 98.4 F | OXYGEN SATURATION: 97 % | BODY MASS INDEX: 25.57 KG/M2 | WEIGHT: 149.75 LBS

## 2018-10-01 DIAGNOSIS — F41.9 ANXIETY: Primary | ICD-10-CM

## 2018-10-01 DIAGNOSIS — Z62.810 HISTORY OF SEXUAL ABUSE IN CHILDHOOD: ICD-10-CM

## 2018-10-01 PROCEDURE — 99214 OFFICE O/P EST MOD 30 MIN: CPT | Performed by: FAMILY MEDICINE

## 2018-10-01 RX ORDER — ESCITALOPRAM OXALATE 10 MG/1
TABLET ORAL
Qty: 90 TABLET | Refills: 0 | Status: SHIPPED | OUTPATIENT
Start: 2018-10-01 | End: 2018-10-30

## 2018-10-01 ASSESSMENT — ANXIETY QUESTIONNAIRES
GAD7 TOTAL SCORE: 6
7. FEELING AFRAID AS IF SOMETHING AWFUL MIGHT HAPPEN: SEVERAL DAYS
5. BEING SO RESTLESS THAT IT IS HARD TO SIT STILL: NOT AT ALL
3. WORRYING TOO MUCH ABOUT DIFFERENT THINGS: SEVERAL DAYS
IF YOU CHECKED OFF ANY PROBLEMS ON THIS QUESTIONNAIRE, HOW DIFFICULT HAVE THESE PROBLEMS MADE IT FOR YOU TO DO YOUR WORK, TAKE CARE OF THINGS AT HOME, OR GET ALONG WITH OTHER PEOPLE: SOMEWHAT DIFFICULT
1. FEELING NERVOUS, ANXIOUS, OR ON EDGE: SEVERAL DAYS
2. NOT BEING ABLE TO STOP OR CONTROL WORRYING: SEVERAL DAYS
6. BECOMING EASILY ANNOYED OR IRRITABLE: SEVERAL DAYS

## 2018-10-01 ASSESSMENT — PATIENT HEALTH QUESTIONNAIRE - PHQ9: 5. POOR APPETITE OR OVEREATING: SEVERAL DAYS

## 2018-10-01 NOTE — PATIENT INSTRUCTIONS
Mental Health Resources    Crisis Connection [phone]    [facility] [phone]    [facility] [phone]     National Suicide Prevention 8-186-907-7899     Suicide Prevention [phone]       [facility] Emergency Department  Behavioral Emergency Center  [address] [place] [place] [address]  [phone]    [facility]  Acute Psychiatric Services (APS)   [address] [place] [address]   ([facility], 1st floor)  [phone]  Suicide Hotline: 947.986.9001    [facility]  [phone]    Crisis Text Line  http://www.crisistextline.org   The Crisis Text Line serves anyone, in any type of crisis, providing access to free, 24/7 support and information via the medium people already use and trust:     Here's how it works:  1. Text 492-815 from anywhere in the [place], anytime, about any type of crisis.  2. A live, trained Crisis Counselor receives the text and responds quickly.  3. The volunteer Crisis Counselor will help you move from a 'hot moment to a cool moment'

## 2018-10-01 NOTE — PROGRESS NOTES
"  Injectable Influenza Immunization Documentation    1.  Is the person to be vaccinated sick today?  {YES/NO DEFAULT NO:70901::\" No\"}    2. Does the person to be vaccinated have an allergy to a component   of the vaccine?  {YES/NO DEFAULT NO:09396::\" No\"}  Egg Allergy Algorithm Link    3. Has the person to be vaccinated ever had a serious reaction   to influenza vaccine in the past?  {YES/NO DEFAULT NO:21002::\" No\"}    4. Has the person to be vaccinated ever had Guillain-Barré syndrome?  {YES/NO DEFAULT NO:40291::\" No\"}    Form completed by ***         "

## 2018-10-01 NOTE — MR AVS SNAPSHOT
After Visit Summary   10/1/2018    Dorie Armendariz    MRN: [id number]           Patient Information     Date Of Birth          1975        Visit Information        Provider Department      10/1/2018 3:20 PM Belgica Vogel MD [facility]        Today's Diagnoses     Anxiety    -  1      Care Instructions    Mental Health Resources    Crisis Connection [phone]    [facility] [phone]    [facility] [phone]     National Suicide Prevention [phone]     Suicide Prevention [phone]       [facility] Emergency Department  Behavioral Emergency Center  [address] [place] [address]  [phone]    [facility]  Acute Psychiatric Services (APS)   [address] [place] [address]   ([facility], 1st floor)  [phone]  Suicide Hotline: 615.580.9627    [facility]  [phone]    Crisis Text Line  http://www.crisistextline.org   The Crisis Text Line serves anyone, in any type of crisis, providing access to free, 24/7 support and information via the medium people already use and trust:     Here's how it works:  1. Text 731-188 from anywhere in the [place], anytime, about any type of crisis.  2. A live, trained Crisis Counselor receives the text and responds quickly.  3. The volunteer Crisis Counselor will help you move from a 'hot moment to a cool moment'             Follow-ups after your visit        Follow-up notes from your care team     Return in about 4 weeks (around 10/29/2018).      Who to contact     If you have questions or need follow up information about today's clinic visit or your schedule please contact [facility] directly at [phone].  Normal or non-critical lab and imaging results will be communicated to you by MyChart, letter or phone within 4 business days after the clinic has received the results. If you do not hear from us within 7 days, please  "contact the clinic through [employer] or phone. If you have a critical or abnormal lab result, we will notify you by phone as soon as possible.  Submit refill requests through [employer] or call your pharmacy and they will forward the refill request to us. Please allow 3 business days for your refill to be completed.          Additional Information About Your Visit        [employer] Information     [employer] gives you secure access to your electronic health record. If you see a primary care provider, you can also send messages to your care team and make appointments. If you have questions, please call your primary care clinic.  If you do not have a primary care provider, please call [phone] and they will assist you.        Care EveryWhere ID     This is your Care EveryWhere ID. This could be used by other organizations to access your [facility] medical records  [id number]        Your Vitals Were     Pulse Temperature Respirations Height Last Period Pulse Oximetry    84 98.4  F (36.9  C) (Oral) 16 5' 4\" (1.626 m) 06/11/2013 97%    BMI (Body Mass Index)                   25.7 kg/m2            Blood Pressure from Last 3 Encounters:   10/01/18 134/89   09/29/18 (!) 164/104   07/12/18 127/89    Weight from Last 3 Encounters:   10/01/18 149 lb 12 oz (67.9 kg)   09/29/18 150 lb (68 kg)   07/12/18 145 lb (65.8 kg)              Today, you had the following     No orders found for display         Today's Medication Changes          These changes are accurate as of 10/1/18  4:02 PM.  If you have any questions, ask your nurse or doctor.               Start taking these medicines.        Dose/Directions    escitalopram 10 MG tablet   Commonly known as:  LEXAPRO   Used for:  Anxiety   Started by:  Belgica Vogel MD        TAKE 1 TABLET(10 MG) BY MOUTH DAILY   Quantity:  90 tablet   Refills:  0         Stop taking these medicines if you haven't already. Please contact your care team if you have questions.     hyoscyamine 0.125 MG " tablet   Commonly known as:  ANASPAZ/LEVSIN   Stopped by:  Belgica Vogel MD           sucralfate 1 GM tablet   Commonly known as:  CARAFATE   Stopped by:  Belgica Vogel MD                Where to get your medicines      These medications were sent to [facility] [address]  [address], [facility] [address]     Phone:  [phone]     escitalopram 10 MG tablet                Primary Care Provider Office Phone # Fax #    Belgica Vogel [phone]       [address]  [facility] [address]        Equal Access to Services     [facility]: Hadii alexis helms hadasho Soericali, waaxda luqadaha, qaybta kaalmada adeegyada, leena anderson . So [facility] [phone].    ATENCIÓN: Si habla español, tiene a de leon disposición servicios gratuitos de asistencia lingüística. Llame al [phone].    We comply with applicable federal civil rights laws and [place] laws. We do not discriminate on the basis of race, color, national origin, age, disability, sex, sexual orientation, or gender identity.            Thank you!     Thank you for choosing [facility]  for your care. Our goal is always to provide you with excellent care. Hearing back from our patients is one way we can continue to improve our services. Please take a few minutes to complete the written survey that you may receive in the mail after your visit with us. Thank you!             Your Updated Medication List - Protect others around you: Learn how to safely use, store and throw away your medicines at www.disposemymeds.org.          This list is accurate as of 10/1/18  4:02 PM.  Always use your most recent med list.                   Brand Name Dispense Instructions for use Diagnosis    ALPRAZolam 0.25 MG tablet    XANAX    20 tablet    Take 1 tablet (0.25 mg) by mouth 3 times daily as needed for anxiety    Anxiety       escitalopram 10 MG tablet    LEXAPRO    90  tablet    TAKE 1 TABLET(10 MG) BY MOUTH DAILY    Anxiety       losartan 100 MG tablet    COZAAR    90 tablet    Take 1 tablet (100 mg) by mouth daily    Essential hypertension with goal blood pressure less than 140/90       polyethylene glycol powder    MIRALAX    510 g    Take 17 g (1 capful) by mouth daily    Constipation, unspecified constipation type

## 2018-10-02 ASSESSMENT — PATIENT HEALTH QUESTIONNAIRE - PHQ9: SUM OF ALL RESPONSES TO PHQ QUESTIONS 1-9: 7

## 2018-10-02 ASSESSMENT — ANXIETY QUESTIONNAIRES: GAD7 TOTAL SCORE: 6

## 2018-10-29 ENCOUNTER — MYC MEDICAL ADVICE (OUTPATIENT)
Dept: FAMILY MEDICINE | Facility: CLINIC | Age: 43
End: 2018-10-29

## 2018-10-29 NOTE — TELEPHONE ENCOUNTER
Per pharmacy pt did  the escitalopram on 10/1/18 but insurance limited her to #30 so she will be out soon.     She needs to do evisit with PCP. See note to her    Mirian Oseguera, RN, BSN

## 2018-10-30 ENCOUNTER — E-VISIT (OUTPATIENT)
Dept: FAMILY MEDICINE | Facility: CLINIC | Age: 43
End: 2018-10-30
Payer: COMMERCIAL

## 2018-10-30 DIAGNOSIS — F41.9 ANXIETY: ICD-10-CM

## 2018-10-30 PROCEDURE — 99444 ZZC PHYSICIAN ONLINE EVALUATION & MANAGEMENT SERVICE: CPT | Performed by: FAMILY MEDICINE

## 2018-10-30 RX ORDER — ESCITALOPRAM OXALATE 10 MG/1
TABLET ORAL
Qty: 90 TABLET | Refills: 1 | Status: SHIPPED | OUTPATIENT
Start: 2018-10-30 | End: 2019-05-20

## 2018-11-12 ENCOUNTER — MYC REFILL (OUTPATIENT)
Dept: FAMILY MEDICINE | Facility: CLINIC | Age: 43
End: 2018-11-12

## 2018-11-12 DIAGNOSIS — F41.9 ANXIETY: ICD-10-CM

## 2018-11-12 RX ORDER — ESCITALOPRAM OXALATE 10 MG/1
TABLET ORAL
Qty: 90 TABLET | Refills: 1 | Status: CANCELLED | OUTPATIENT
Start: 2018-11-12

## 2018-11-12 NOTE — TELEPHONE ENCOUNTER
Message from MyChart:  Original authorizing provider: MD Dorie Durbin would like a refill of the following medications:  escitalopram (LEXAPRO) 10 MG tablet [Belgica Vogel MD]    Preferred pharmacy: [facility] [address] [place] [address] AT [address]    Comment:

## 2018-11-13 NOTE — TELEPHONE ENCOUNTER
"Requested Prescriptions   Pending Prescriptions Disp Refills     escitalopram (LEXAPRO) 10 MG tablet  Last Written Prescription Date:  10/30/2018  Last Fill Quantity: 90 tablet,  # refills: 1   Last Office Visit: 10/1/2018   Future Office Visit:      90 tablet 1     Sig: TAKE 1 TABLET(10 MG) BY MOUTH DAILY    SSRIs Protocol Passed    11/12/2018  3:05 PM       Passed - Recent (12 mo) or future (30 days) visit within the authorizing provider's specialty    Patient had office visit in the last 12 months or has a visit in the next 30 days with authorizing provider or within the authorizing provider's specialty.  See \"Patient Info\" tab in inbasket, or \"Choose Columns\" in Meds & Orders section of the refill encounter.           Passed - Patient is age 18 or older       Passed - No active pregnancy on record       Passed - No positive pregnancy test in last 12 months          "

## 2018-12-31 DIAGNOSIS — I10 ESSENTIAL HYPERTENSION WITH GOAL BLOOD PRESSURE LESS THAN 140/90: ICD-10-CM

## 2018-12-31 RX ORDER — LOSARTAN POTASSIUM 100 MG/1
100 TABLET ORAL DAILY
Qty: 90 TABLET | Refills: 1 | Status: SHIPPED | OUTPATIENT
Start: 2018-12-31 | End: 2019-02-07

## 2018-12-31 NOTE — TELEPHONE ENCOUNTER
"Requested Prescriptions   Pending Prescriptions Disp Refills     losartan (COZAAR) 100 MG tablet  Last Written Prescription Date:  5/1/2018  Last Fill Quantity: 90 TABLET,  # refills: 2   Last Office Visit: 10/1/2018   Future Office Visit:      90 tablet 2     Sig: Take 1 tablet (100 mg) by mouth daily    Angiotensin-II Receptors Passed - 12/31/2018 11:49 AM       Passed - Blood pressure under 140/90 in past 12 months    BP Readings from Last 3 Encounters:   10/01/18 134/89   09/29/18 (!) 164/104   07/12/18 127/89          Passed - Recent (12 mo) or future (30 days) visit within the authorizing provider's specialty    Patient had office visit in the last 12 months or has a visit in the next 30 days with authorizing provider or within the authorizing provider's specialty.  See \"Patient Info\" tab in inbasket, or \"Choose Columns\" in Meds & Orders section of the refill encounter.           Passed - Patient is age 18 or older       Passed - No active pregnancy on record       Passed - Normal serum creatinine on file in past 12 months    Recent Labs   Lab Test 07/07/18  1604   CR 0.69          Passed - Normal serum potassium on file in past 12 months    Recent Labs   Lab Test 07/07/18  1604   POTASSIUM 3.6           Passed - No positive pregnancy test in past 12 months          "

## 2019-01-01 NOTE — TELEPHONE ENCOUNTER
Prescription approved per [facility] Refill Protocol.    Signed Prescriptions:                        Disp   Refills    losartan (COZAAR) 100 MG tablet            90 tab*1        Sig: Take 1 tablet (100 mg) by mouth daily  Authorizing Provider: PORTILLO COLE  Ordering User: KATIE ROMERO      Closing encounter - no further actions needed at this time    Katie Romero RN

## 2019-01-10 ENCOUNTER — MYC REFILL (OUTPATIENT)
Dept: FAMILY MEDICINE | Facility: CLINIC | Age: 44
End: 2019-01-10

## 2019-01-10 DIAGNOSIS — I10 ESSENTIAL HYPERTENSION WITH GOAL BLOOD PRESSURE LESS THAN 140/90: ICD-10-CM

## 2019-01-10 DIAGNOSIS — F41.9 ANXIETY: ICD-10-CM

## 2019-01-10 RX ORDER — LOSARTAN POTASSIUM 100 MG/1
100 TABLET ORAL DAILY
Qty: 90 TABLET | Refills: 1 | OUTPATIENT
Start: 2019-01-10

## 2019-01-10 RX ORDER — ALPRAZOLAM 0.25 MG
0.25 TABLET ORAL 3 TIMES DAILY PRN
Qty: 20 TABLET | Refills: 0 | Status: SHIPPED | OUTPATIENT
Start: 2019-01-10 | End: 2019-11-06

## 2019-01-10 NOTE — TELEPHONE ENCOUNTER
Uses alprazolam sparingly - last got #20 tabs a year ago.  [place] Prescription Monitoring Program website checked.  No other controlled substances in past year.  I refilled.      Script is in [facility].  Please fax to pharmacy.  Belgica Vogel MD

## 2019-01-10 NOTE — TELEPHONE ENCOUNTER
"Requested Prescriptions   Pending Prescriptions Disp Refills     ALPRAZolam (XANAX) 0.25 MG tablet     Last Written Prescription Date:  1/29/2018  Last Fill Quantity: 20 tablet,   # refills: 0  Last Office Visit: 10/1/2018  Future Office visit:       Routing refill request to provider for review/approval because:  Drug not on the [facility], [facility] or [facility] refill protocol or controlled substance                  _________________________________________________________________________________________________________________________       losartan (COZAAR) 100 MG tablet  Last Written Prescription Date:  12/31/2018  Last Fill Quantity: 90 tablet,  # refills: 1   Last Office Visit: 10/1/2018   Future Office Visit:      90 tablet 1     Sig: Take 1 tablet (100 mg) by mouth daily    Angiotensin-II Receptors Passed - 1/10/2019  1:43 PM       Passed - Blood pressure under 140/90 in past 12 months    BP Readings from Last 3 Encounters:   10/01/18 134/89   09/29/18 (!) 164/104   07/12/18 127/89          Passed - Recent (12 mo) or future (30 days) visit within the authorizing provider's specialty    Patient had office visit in the last 12 months or has a visit in the next 30 days with authorizing provider or within the authorizing provider's specialty.  See \"Patient Info\" tab in inbasket, or \"Choose Columns\" in Meds & Orders section of the refill encounter.           Passed - Medication is active on med list       Passed - Patient is age 18 or older       Passed - No active pregnancy on record       Passed - Normal serum creatinine on file in past 12 months    Recent Labs   Lab Test 07/07/18  1604   CR 0.69          Passed - Normal serum potassium on file in past 12 months    Recent Labs   Lab Test 07/07/18  1604   POTASSIUM 3.6           Passed - No positive pregnancy test in past 12 months          "

## 2019-01-10 NOTE — TELEPHONE ENCOUNTER
Refused Losartan as duplicate - sent to pharmacy on 12/31/18 #90 with 1 refill     Routing refill request to provider for review/approval because:  Drug not on the [facility] refill protocol     Pending Prescriptions:                       Disp   Refills    ALPRAZolam (XANAX) 0.25 MG tablet         20 tab*0            Sig: Take 1 tablet (0.25 mg) by mouth 3 times daily as           needed for anxiety  Refused Prescriptions:                       Disp   Refills    losartan (COZAAR) 100 MG tablet            90 tab*1        Sig: Take 1 tablet (100 mg) by mouth daily  Refused By: MARIS GIBSON  Reason for Refusal: Duplicate    Maris Gibson Registered Nurse   [facility]

## 2019-01-11 NOTE — TELEPHONE ENCOUNTER
Faxed script to [facility]  Rx for: alprazolam 0.25mg tab  Start date: Juan 10 2019  Dispense Qty: 20 (twenty)  Refill: 0 (zero)  Brianda: Rosalva Schmitt CMA

## 2019-01-22 ENCOUNTER — MYC MEDICAL ADVICE (OUTPATIENT)
Dept: FAMILY MEDICINE | Facility: CLINIC | Age: 44
End: 2019-01-22

## 2019-02-07 ENCOUNTER — MYC REFILL (OUTPATIENT)
Dept: FAMILY MEDICINE | Facility: CLINIC | Age: 44
End: 2019-02-07

## 2019-02-07 DIAGNOSIS — I10 ESSENTIAL HYPERTENSION WITH GOAL BLOOD PRESSURE LESS THAN 140/90: ICD-10-CM

## 2019-02-07 NOTE — TELEPHONE ENCOUNTER
"Requested Prescriptions   Pending Prescriptions Disp Refills     losartan (COZAAR) 100 MG tablet [Pharmacy Med Name: LOSARTAN 100MG TABLETS]  Last Written Prescription Date:  12/31/2018  Last Fill Quantity: 90 tablet,  # refills: 1   Last Office Visit: 10/1/2018   Future Office Visit:      90 tablet 0     Sig: TAKE ONE TABLET BY MOUTH DAILY    Angiotensin-II Receptors Passed - 2/7/2019  1:45 PM       Passed - Blood pressure under 140/90 in past 12 months    BP Readings from Last 3 Encounters:   10/01/18 134/89   09/29/18 (!) 164/104   07/12/18 127/89          Passed - Recent (12 mo) or future (30 days) visit within the authorizing provider's specialty    Patient had office visit in the last 12 months or has a visit in the next 30 days with authorizing provider or within the authorizing provider's specialty.  See \"Patient Info\" tab in inbasket, or \"Choose Columns\" in Meds & Orders section of the refill encounter.           Passed - Medication is active on med list       Passed - Patient is age 18 or older       Passed - No active pregnancy on record       Passed - Normal serum creatinine on file in past 12 months    Recent Labs   Lab Test 07/07/18  1604   CR 0.69          Passed - Normal serum potassium on file in past 12 months    Recent Labs   Lab Test 07/07/18  1604   POTASSIUM 3.6           Passed - No positive pregnancy test in past 12 months          "

## 2019-02-11 RX ORDER — LOSARTAN POTASSIUM 100 MG/1
100 TABLET ORAL DAILY
Qty: 90 TABLET | Refills: 0 | Status: SHIPPED | OUTPATIENT
Start: 2019-02-11 | End: 2019-05-20

## 2019-02-11 NOTE — TELEPHONE ENCOUNTER
"Requested Prescriptions   Pending Prescriptions Disp Refills     losartan (COZAAR) 100 MG tablet  Last Written Prescription Date:  12/31/2018  Last Fill Quantity: 90 tablet,  # refills: 1   Last Office Visit: 10/1/2018   Future Office Visit:      90 tablet 1     Sig: Take 1 tablet (100 mg) by mouth daily    Angiotensin-II Receptors Passed - 2/11/2019  6:26 AM       Passed - Blood pressure under 140/90 in past 12 months    BP Readings from Last 3 Encounters:   10/01/18 134/89   09/29/18 (!) 164/104   07/12/18 127/89          Passed - Recent (12 mo) or future (30 days) visit within the authorizing provider's specialty    Patient had office visit in the last 12 months or has a visit in the next 30 days with authorizing provider or within the authorizing provider's specialty.  See \"Patient Info\" tab in inbasket, or \"Choose Columns\" in Meds & Orders section of the refill encounter.           Passed - Medication is active on med list       Passed - Patient is age 18 or older       Passed - No active pregnancy on record       Passed - Normal serum creatinine on file in past 12 months    Recent Labs   Lab Test 07/07/18  1604   CR 0.69          Passed - Normal serum potassium on file in past 12 months    Recent Labs   Lab Test 07/07/18  1604   POTASSIUM 3.6           Passed - No positive pregnancy test in past 12 months          "

## 2019-02-11 NOTE — TELEPHONE ENCOUNTER
Prescription approved per [facility] Refill Protocol.    Violeta Brewster, RN  Triage Nurse

## 2019-02-12 RX ORDER — LOSARTAN POTASSIUM 100 MG/1
TABLET ORAL
Qty: 90 TABLET | Refills: 0 | OUTPATIENT
Start: 2019-02-12

## 2019-04-17 ENCOUNTER — OFFICE VISIT (OUTPATIENT)
Dept: URGENT CARE | Facility: URGENT CARE | Age: 44
End: 2019-04-17
Payer: COMMERCIAL

## 2019-04-17 VITALS
OXYGEN SATURATION: 99 % | SYSTOLIC BLOOD PRESSURE: 132 MMHG | BODY MASS INDEX: 25.58 KG/M2 | HEART RATE: 90 BPM | DIASTOLIC BLOOD PRESSURE: 84 MMHG | RESPIRATION RATE: 18 BRPM | WEIGHT: 149 LBS | TEMPERATURE: 98.3 F

## 2019-04-17 DIAGNOSIS — R10.11 RUQ ABDOMINAL PAIN: ICD-10-CM

## 2019-04-17 DIAGNOSIS — R10.31 RLQ ABDOMINAL PAIN: ICD-10-CM

## 2019-04-17 DIAGNOSIS — R10.84 ABDOMINAL PAIN, GENERALIZED: Primary | ICD-10-CM

## 2019-04-17 LAB
ALBUMIN SERPL-MCNC: 4.5 G/DL (ref 3.4–5)
ALBUMIN UR-MCNC: NEGATIVE MG/DL
ALP SERPL-CCNC: 34 U/L (ref 40–150)
ALT SERPL W P-5'-P-CCNC: 21 U/L (ref 0–50)
ANION GAP SERPL CALCULATED.3IONS-SCNC: 8 MMOL/L (ref 3–14)
APPEARANCE UR: CLEAR
AST SERPL W P-5'-P-CCNC: 21 U/L (ref 0–45)
BILIRUB SERPL-MCNC: 1 MG/DL (ref 0.2–1.3)
BILIRUB UR QL STRIP: NEGATIVE
BUN SERPL-MCNC: 15 MG/DL (ref 7–30)
CALCIUM SERPL-MCNC: 10 MG/DL (ref 8.5–10.1)
CHLORIDE SERPL-SCNC: 101 MMOL/L (ref 94–109)
CO2 SERPL-SCNC: 30 MMOL/L (ref 20–32)
COLOR UR AUTO: YELLOW
CREAT SERPL-MCNC: 0.8 MG/DL (ref 0.52–1.04)
ERYTHROCYTE [DISTWIDTH] IN BLOOD BY AUTOMATED COUNT: 11.4 % (ref 10–15)
GFR SERPL CREATININE-BSD FRML MDRD: 89 ML/MIN/{1.73_M2}
GLUCOSE SERPL-MCNC: 101 MG/DL (ref 70–99)
GLUCOSE UR STRIP-MCNC: NEGATIVE MG/DL
HCT VFR BLD AUTO: 43.5 % (ref 35–47)
HGB BLD-MCNC: 14.9 G/DL (ref 11.7–15.7)
HGB UR QL STRIP: NEGATIVE
KETONES UR STRIP-MCNC: NEGATIVE MG/DL
LEUKOCYTE ESTERASE UR QL STRIP: NEGATIVE
MCH RBC QN AUTO: 32.7 PG (ref 26.5–33)
MCHC RBC AUTO-ENTMCNC: 34.3 G/DL (ref 31.5–36.5)
MCV RBC AUTO: 96 FL (ref 78–100)
NITRATE UR QL: NEGATIVE
PH UR STRIP: 6 PH (ref 5–7)
PLATELET # BLD AUTO: 245 10E9/L (ref 150–450)
POTASSIUM SERPL-SCNC: 3.9 MMOL/L (ref 3.4–5.3)
PROT SERPL-MCNC: 7.5 G/DL (ref 6.8–8.8)
RBC # BLD AUTO: 4.55 10E12/L (ref 3.8–5.2)
SODIUM SERPL-SCNC: 139 MMOL/L (ref 133–144)
SOURCE: NORMAL
SP GR UR STRIP: <=1.005 (ref 1–1.03)
SPECIMEN SOURCE: NORMAL
UROBILINOGEN UR STRIP-ACNC: 0.2 EU/DL (ref 0.2–1)
WBC # BLD AUTO: 6.8 10E9/L (ref 4–11)
WET PREP SPEC: NORMAL

## 2019-04-17 PROCEDURE — 36415 COLL VENOUS BLD VENIPUNCTURE: CPT | Performed by: PHYSICIAN ASSISTANT

## 2019-04-17 PROCEDURE — 87210 SMEAR WET MOUNT SALINE/INK: CPT | Performed by: PHYSICIAN ASSISTANT

## 2019-04-17 PROCEDURE — 85027 COMPLETE CBC AUTOMATED: CPT | Performed by: PHYSICIAN ASSISTANT

## 2019-04-17 PROCEDURE — 99213 OFFICE O/P EST LOW 20 MIN: CPT | Performed by: PHYSICIAN ASSISTANT

## 2019-04-17 PROCEDURE — 80053 COMPREHEN METABOLIC PANEL: CPT | Performed by: PHYSICIAN ASSISTANT

## 2019-04-17 PROCEDURE — 81003 URINALYSIS AUTO W/O SCOPE: CPT | Performed by: PHYSICIAN ASSISTANT

## 2019-04-17 ASSESSMENT — PAIN SCALES - GENERAL: PAINLEVEL: MODERATE PAIN (4)

## 2019-04-17 NOTE — PROGRESS NOTES
SUBJECTIVE:  Chief Complaint   Patient presents with     Urgent Care     Abdominal Pain     R side abdominal pian/diarrhea X1 wk      Dorie Armendariz is a 43 year old female whose symptoms began 7-8 days ago and include abdominal pain RUQ and RLQ and diarrhea.    Symptoms are still present and moderate.  2 loose stools with orange hue per 24 hours.  Baseline oose stools despite daily miralax. Urinary frequency.    Baseline urinary heistancy.   Aggravating factors: nothing.    Alleviating factors:nothing  Associated symptoms:  Pain:Pain Location:  epigastric, RUQ and lower abdomen  Pain Quality: lower pressure, sharp stabbing in upper right  Fever: no noted fevers  Diarrhea:  consists of 2 stools/day and is persisting  Stools: a few loose stools  Appetite: decreased  Risk factors: Candidate for possible of IBS    6 years ago hysterectomy    6 years ago viral hepatitis    Appendix out    Past Medical History:   Diagnosis Date     Anxiety     citalopram in the past, minimal xanax use      Hypertension goal BP (blood pressure) < 140/90      IBS (irritable bowel syndrome)      Major depression 11/14/2016     Migraine      Ovarian cyst      Uterine fibroid      Viral hepatitis 4/2013   .  Current Outpatient Medications   Medication Sig Dispense Refill     ALPRAZolam (XANAX) 0.25 MG tablet Take 1 tablet (0.25 mg) by mouth 3 times daily as needed for anxiety 20 tablet 0     escitalopram (LEXAPRO) 10 MG tablet TAKE 1 TABLET(10 MG) BY MOUTH DAILY 90 tablet 1     losartan (COZAAR) 100 MG tablet Take 1 tablet (100 mg) by mouth daily 90 tablet 0     polyethylene glycol (MIRALAX) powder Take 17 g (1 capful) by mouth daily 510 g 11     Social History     Tobacco Use     Smoking status: Never Smoker     Smokeless tobacco: Never Used   Substance Use Topics     Alcohol use: Yes     Alcohol/week: 4.8 oz     Types: 8 Glasses of wine per week     Comment: 4-5 drinks a week        ROS:  10 point ROS negative except as listed  above      OBJECTIVE:  /84 (BP Location: Right arm, Patient Position: Left side, Cuff Size: Adult Small)   Pulse 90   Temp 98.3  F (36.8  C)   Resp 18   Wt 67.6 kg (149 lb)   LMP 06/11/2013   SpO2 99%   BMI 25.58 kg/m    GENERAL APPEARANCE: healthy, alert and no distress  EYES:conjunctiva clear  HENT: ear canals and TM's normal.  Nose and mouth without ulcers, erythema or lesions  NECK: supple, nontender, no lymphadenopathy  RESP: lungs clear to auscultation - no rales, rhonchi or wheezes  CV: regular rates and rhythm, normal S1 S2, no murmur noted  ABDOMEN:  soft, nontender, no HSM or masses and bowel sounds normal  Back: No CVA tenderness  NEURO: Normal strength and tone, sensory exam grossly normal,  normal speech and mentation  SKIN: no suspicious lesions or rashes    ASSESSMENT:  (R10.84) Abdominal pain, generalized  (primary encounter diagnosis)  Comment: Likely viral syndrome  Plan: *UA reflex to Microscopic and Culture ([place]         and [facility] (except [place] and         [place]), Wet prep      (R10.31) RLQ abdominal pain  Plan: CBC with platelets       (R10.11) RUQ abdominal pain  Plan: Comprehensive metabolic panel        Red flags and emergent follow up discussed, and understood by patient  Follow up with PCP if symptoms worsen or fail to improve

## 2019-05-20 DIAGNOSIS — F41.9 ANXIETY: ICD-10-CM

## 2019-05-20 DIAGNOSIS — I10 ESSENTIAL HYPERTENSION WITH GOAL BLOOD PRESSURE LESS THAN 140/90: ICD-10-CM

## 2019-05-20 NOTE — TELEPHONE ENCOUNTER
"Requested Prescriptions   Pending Prescriptions Disp Refills     escitalopram (LEXAPRO) 10 MG tablet [Pharmacy Med Name: ESCITALOPRAM 10MG TABLETS] 90 tablet 0     Sig: TAKE 1 TABLET BY MOUTH DAILY       SSRIs Protocol Passed - 5/20/2019  9:05 AM        Passed - Recent (12 mo) or future (30 days) visit within the authorizing provider's specialty     Patient had office visit in the last 12 months or has a visit in the next 30 days with authorizing provider or within the authorizing provider's specialty.  See \"Patient Info\" tab in inbasket, or \"Choose Columns\" in Meds & Orders section of the refill encounter.              Passed - Medication is active on med list        Passed - Patient is age 18 or older        Passed - No active pregnancy on record        Passed - No positive pregnancy test in last 12 months        losartan (COZAAR) 100 MG tablet [Pharmacy Med Name: LOSARTAN 100MG TABLETS] 90 tablet 0     Sig: TAKE 1 TABLET BY MOUTH DAILY  Last Written Prescription Date:  2/11/2019  Last Fill Quantity: 90 tablet,  # refills: 0   Last Office Visit: 10/1/2018   Future Office Visit:            Angiotensin-II Receptors Passed - 5/20/2019  9:05 AM        Passed - Blood pressure under 140/90 in past 12 months     BP Readings from Last 3 Encounters:   04/17/19 132/84   10/01/18 134/89   09/29/18 (!) 164/104           Passed - Recent (12 mo) or future (30 days) visit within the authorizing provider's specialty     Patient had office visit in the last 12 months or has a visit in the next 30 days with authorizing provider or within the authorizing provider's specialty.  See \"Patient Info\" tab in inbasket, or \"Choose Columns\" in Meds & Orders section of the refill encounter.            Passed - Medication is active on med list        Passed - Patient is age 18 or older        Passed - No active pregnancy on record        Passed - Normal serum creatinine on file in past 12 months     Recent Labs   Lab Test 04/17/19  1140   CR " 0.80           Passed - Normal serum potassium on file in past 12 months     Recent Labs   Lab Test 04/17/19  1140   POTASSIUM 3.9            Passed - No positive pregnancy test in past 12 months

## 2019-05-22 RX ORDER — LOSARTAN POTASSIUM 100 MG/1
TABLET ORAL
Qty: 90 TABLET | Refills: 0 | Status: SHIPPED | OUTPATIENT
Start: 2019-05-22 | End: 2019-05-28

## 2019-05-22 RX ORDER — ESCITALOPRAM OXALATE 10 MG/1
TABLET ORAL
Qty: 90 TABLET | Refills: 0 | Status: SHIPPED | OUTPATIENT
Start: 2019-05-22 | End: 2019-05-28

## 2019-05-22 NOTE — TELEPHONE ENCOUNTER
Dr. Vogel-Please advise if you prefer office visit or eVisit for anxiety follow up.     Losartan refilled according to refill protocol.    Thank you!  MARILY ParhamN, RN

## 2019-05-23 ENCOUNTER — E-VISIT (OUTPATIENT)
Dept: FAMILY MEDICINE | Facility: CLINIC | Age: 44
End: 2019-05-23
Payer: COMMERCIAL

## 2019-05-23 DIAGNOSIS — I10 ESSENTIAL HYPERTENSION WITH GOAL BLOOD PRESSURE LESS THAN 140/90: ICD-10-CM

## 2019-05-23 DIAGNOSIS — F41.9 ANXIETY: ICD-10-CM

## 2019-05-23 PROCEDURE — 99444 ZZC PHYSICIAN ONLINE EVALUATION & MANAGEMENT SERVICE: CPT | Performed by: FAMILY MEDICINE

## 2019-05-23 ASSESSMENT — ANXIETY QUESTIONNAIRES
6. BECOMING EASILY ANNOYED OR IRRITABLE: NOT AT ALL
7. FEELING AFRAID AS IF SOMETHING AWFUL MIGHT HAPPEN: NOT AT ALL
3. WORRYING TOO MUCH ABOUT DIFFERENT THINGS: NOT AT ALL
GAD7 TOTAL SCORE: 0
1. FEELING NERVOUS, ANXIOUS, OR ON EDGE: NOT AT ALL
7. FEELING AFRAID AS IF SOMETHING AWFUL MIGHT HAPPEN: NOT AT ALL
GAD7 TOTAL SCORE: 0
2. NOT BEING ABLE TO STOP OR CONTROL WORRYING: NOT AT ALL
5. BEING SO RESTLESS THAT IT IS HARD TO SIT STILL: NOT AT ALL
4. TROUBLE RELAXING: NOT AT ALL

## 2019-05-23 NOTE — TELEPHONE ENCOUNTER
Pt called and relate the message back to pt about setting up an office visit or e-visit. Pt understood. Will set it up in [employer].   Closing TE.      Monster Barcenas MA

## 2019-05-23 NOTE — TELEPHONE ENCOUNTER
RN - please also assign her a PHQ9 to complete.            PHQ-9 SCORE 1/29/2018 6/11/2018 10/1/2018   PHQ-9 Total Score 0 0 7     [facility] Follow-up to PHQ 1/29/2018 6/11/2018 10/1/2018   PHQ-9 9. Suicide Ideation past 2 weeks Not at all Not at all Several days   Thoughts of suicide or self harm in past 2 weeks - - Yes   PHQ-9 Self harm plan? - - No   PHQ-9 Self harm action? - - Yes   PHQ-9 Safety concerns? - - Yes     DEVYN-7 SCORE 6/11/2018 10/1/2018 5/23/2019   Total Score - - 0 (minimal anxiety)   Total Score 0 6 0

## 2019-05-23 NOTE — TELEPHONE ENCOUNTER
I have attempted to contact this patient by phone with the following results: left message to return my call on answering machine.    1st attempt    See notes below      Monster Barcenas MA on 5/23/2019 at 9:11 AM

## 2019-05-24 ASSESSMENT — ANXIETY QUESTIONNAIRES: GAD7 TOTAL SCORE: 0

## 2019-05-28 RX ORDER — ESCITALOPRAM OXALATE 10 MG/1
TABLET ORAL
Qty: 90 TABLET | Refills: 1 | Status: SHIPPED | OUTPATIENT
Start: 2019-05-28 | End: 2019-11-06

## 2019-05-28 RX ORDER — LOSARTAN POTASSIUM 100 MG/1
100 TABLET ORAL DAILY
Qty: 90 TABLET | Refills: 1 | Status: SHIPPED | OUTPATIENT
Start: 2019-05-28 | End: 2019-11-06

## 2019-05-28 NOTE — TELEPHONE ENCOUNTER
PHQ-9 SCORE 6/11/2018 10/1/2018 5/28/2019   PHQ-9 Total Score MyChart - - 0   PHQ-9 Total Score 0 7 0      DEVYN-7 SCORE 6/11/2018 10/1/2018 5/23/2019   Total Score - - 0 (minimal anxiety)   Total Score 0 6 0

## 2019-07-30 DIAGNOSIS — F41.9 ANXIETY: ICD-10-CM

## 2019-07-30 DIAGNOSIS — I10 ESSENTIAL HYPERTENSION WITH GOAL BLOOD PRESSURE LESS THAN 140/90: ICD-10-CM

## 2019-07-30 RX ORDER — LOSARTAN POTASSIUM 100 MG/1
100 TABLET ORAL DAILY
Qty: 90 TABLET | Refills: 1 | Status: CANCELLED | OUTPATIENT
Start: 2019-07-30

## 2019-07-30 RX ORDER — ESCITALOPRAM OXALATE 10 MG/1
TABLET ORAL
Qty: 90 TABLET | Refills: 1 | Status: CANCELLED | OUTPATIENT
Start: 2019-07-30

## 2019-07-30 NOTE — TELEPHONE ENCOUNTER
"Requested Prescriptions   Pending Prescriptions Disp Refills     escitalopram (LEXAPRO) 10 MG tablet 90 tablet 1     Sig: TAKE 1 TABLET BY MOUTH DAILY  Last Written Prescription Date:  5/28/2019  Last Fill Quantity: 90 tablet,  # refills: 1   Last Office Visit: 10/1/2018   Future Office Visit:            SSRIs Protocol Passed - 7/30/2019 12:55 PM        Passed - Recent (12 mo) or future (30 days) visit within the authorizing provider's specialty     Patient had office visit in the last 12 months or has a visit in the next 30 days with authorizing provider or within the authorizing provider's specialty.  See \"Patient Info\" tab in inbasket, or \"Choose Columns\" in Meds & Orders section of the refill encounter.            Passed - Medication is active on med list        Passed - Patient is age 18 or older        Passed - No active pregnancy on record        Passed - No positive pregnancy test in last 12 months     _________________________________________________________________     losartan (COZAAR) 100 MG tablet 90 tablet 1     Sig: Take 1 tablet (100 mg) by mouth daily  Last Written Prescription Date:  5/28/2019  Last Fill Quantity: 90 tablet,  # refills: 1   Last Office Visit: 10/1/2018   Future Office Visit:            Angiotensin-II Receptors Passed - 7/30/2019 12:55 PM        Passed - Blood pressure under 140/90 in past 12 months     BP Readings from Last 3 Encounters:   04/17/19 132/84   10/01/18 134/89   09/29/18 (!) 164/104           Passed - Recent (12 mo) or future (30 days) visit within the authorizing provider's specialty     Patient had office visit in the last 12 months or has a visit in the next 30 days with authorizing provider or within the authorizing provider's specialty.  See \"Patient Info\" tab in inbasket, or \"Choose Columns\" in Meds & Orders section of the refill encounter.            Passed - Medication is active on med list        Passed - Patient is age 18 or older        Passed - No active " pregnancy on record        Passed - Normal serum creatinine on file in past 12 months     Recent Labs   Lab Test 04/17/19  1140   CR 0.80           Passed - Normal serum potassium on file in past 12 months     Recent Labs   Lab Test 04/17/19  1140   POTASSIUM 3.9            Passed - No positive pregnancy test in past 12 months

## 2019-07-31 ENCOUNTER — MYC MEDICAL ADVICE (OUTPATIENT)
Dept: FAMILY MEDICINE | Facility: CLINIC | Age: 44
End: 2019-07-31

## 2019-08-01 NOTE — TELEPHONE ENCOUNTER
Writer called Pharmacy where there is a refill of Losartan. Patient updated that this will be available for  today.     [facility] to [facility] in the [facility].    Thanks! Bia Lee RN

## 2019-10-21 DIAGNOSIS — I10 ESSENTIAL HYPERTENSION WITH GOAL BLOOD PRESSURE LESS THAN 140/90: ICD-10-CM

## 2019-10-21 DIAGNOSIS — F41.9 ANXIETY: ICD-10-CM

## 2019-10-21 NOTE — TELEPHONE ENCOUNTER
"Requested Prescriptions   Pending Prescriptions Disp Refills     losartan (COZAAR) 100 MG tablet [Pharmacy Med Name: LOSARTAN POTASSIUM 100 MG TAB] 90 tablet 1     Sig: TAKE 1 TABLET BY MOUTH EVERY DAY  Last Written Prescription Date:  5/28/2019  Last Fill Quantity: 90 tablet,  # refills: 1   Last Office Visit: 10/1/2018   Future Office Visit:            Angiotensin-II Receptors Passed - 10/21/2019  7:33 AM        Passed - Last blood pressure under 140/90 in past 12 months     BP Readings from Last 3 Encounters:   04/17/19 132/84   10/01/18 134/89   09/29/18 (!) 164/104           Passed - Recent (12 mo) or future (30 days) visit within the authorizing provider's specialty     Patient has had an office visit with the authorizing provider or a provider within the authorizing providers department within the previous 12 mos or has a future within next 30 days. See \"Patient Info\" tab in inbasket, or \"Choose Columns\" in Meds & Orders section of the refill encounter.            Passed - Medication is active on med list        Passed - Patient is age 18 or older        Passed - No active pregnancy on record        Passed - Normal serum creatinine on file in past 12 months     Recent Labs   Lab Test 04/17/19  1140   CR 0.80           Passed - Normal serum potassium on file in past 12 months     Recent Labs   Lab Test 04/17/19  1140   POTASSIUM 3.9            Passed - No positive pregnancy test in past 12 months     _________________________________________________________________       escitalopram (LEXAPRO) 10 MG tablet [Pharmacy Med Name: ESCITALOPRAM 10 MG TABLET] 90 tablet 1     Sig: TAKE 1 TABLET BY MOUTH EVERY DAY  Last Written Prescription Date:  5/28/2019  Last Fill Quantity: 90 tablet,  # refills: 1   Last Office Visit: 10/1/2018   Future Office Visit:            SSRIs Protocol Passed - 10/21/2019  7:33 AM        Passed - Recent (12 mo) or future (30 days) visit within the authorizing provider's specialty     " "Patient has had an office visit with the authorizing provider or a provider within the authorizing providers department within the previous 12 mos or has a future within next 30 days. See \"Patient Info\" tab in inbasket, or \"Choose Columns\" in Meds & Orders section of the refill encounter.              Passed - Medication is active on med list        Passed - Patient is age 18 or older        Passed - No active pregnancy on record        Passed - No positive pregnancy test in last 12 months          "

## 2019-10-22 RX ORDER — ESCITALOPRAM OXALATE 10 MG/1
TABLET ORAL
Qty: 90 TABLET | Refills: 1 | OUTPATIENT
Start: 2019-10-22

## 2019-10-22 RX ORDER — LOSARTAN POTASSIUM 100 MG/1
TABLET ORAL
Qty: 90 TABLET | Refills: 1 | OUTPATIENT
Start: 2019-10-22

## 2019-10-22 NOTE — TELEPHONE ENCOUNTER
Too early for refill requests.  6 months worth of medications prescribed on 5/28/19.    Medications refused.    FRANKLIN Brown, BSN, RN

## 2019-11-05 NOTE — PROGRESS NOTES
Subjective     Dorie Armendariz is a 43 year old female who presents to clinic today for the following health issues:      HPI    Hypertension Follow-up    Do you check your blood pressure regularly outside of the clinic? No     Are you following a low salt diet? Yes    Are your blood pressures ever more than 140 on the top number (systolic) OR more   than 90 on the bottom number (diastolic), for example 140/90? N/A    Anxiety Follow-Up    How are you doing with your anxiety since your last visit? No change    Are you having other symptoms that might be associated with anxiety? No    Have you had a significant life event? No     Are you feeling depressed? No    Do you have any concerns with your use of alcohol or other drugs? No     She uses alprazolam sparingly - last got #20 in January (10 months ago).  She has a few left but would like refill.    Social History     Tobacco Use     Smoking status: Never Smoker     Smokeless tobacco: Never Used   Substance Use Topics     Alcohol use: Yes     Alcohol/week: 8.0 standard drinks     Types: 8 Glasses of wine per week     Comment: 4-5 drinks a week      Drug use: No     DEVYN-7 SCORE 10/1/2018 5/23/2019 11/6/2019   Total Score - 0 (minimal anxiety) -   Total Score 6 0 0     PHQ 6/11/2018 10/1/2018 5/28/2019   PHQ-9 Total Score 0 7 0   Q9: Thoughts of better off dead/self-harm past 2 weeks Not at all Several days Not at all   F/U: Thoughts of suicide or self-harm - Yes -   F/U: Self harm-plan - No -   F/U: Self-harm action - Yes -   F/U: Safety concerns - Yes -         How many servings of fruits and vegetables do you eat daily?  2-3    On average, how many sweetened beverages do you drink each day (soda, juice, sweet tea, etc)?   0    How many days per week do you miss taking your medication? 0    PROBLEMS TO ADD ON...  Left knee pain   x 2 months   pain 7/10  no therapies tried  pain started when playing basketball - sudden onset  Pain is in lateral anterior knee  She avoided  "basketball for a while but when she tried it again her knee pain worsened  No locking, catching, or give-way but knee feels unstable.        BP Readings from Last 3 Encounters:   11/06/19 118/80   04/17/19 132/84   10/01/18 134/89    Wt Readings from Last 3 Encounters:   11/06/19 72.1 kg (159 lb)   04/17/19 67.6 kg (149 lb)   10/01/18 67.9 kg (149 lb 12 oz)            Reviewed and updated as needed this visit by Provider         Review of Systems   RESP:  NEGATIVE for shortness of breath  CV:  NEGATIVE for chest pain         Objective    /80 (BP Location: Right arm, Patient Position: Sitting, Cuff Size: Adult Regular)   Pulse 77   Temp 97.4  F (36.3  C) (Tympanic)   Resp 14   Ht 1.632 m (5' 4.25\")   Wt 72.1 kg (159 lb)   LMP 06/11/2013   SpO2 98%   Breastfeeding? No   BMI 27.08 kg/m    Body mass index is 27.08 kg/m .  Physical Exam   GEN:  no apparent distress  EYES: sclerae and conjunctivae clear with no discharge  LUNGS:  normal respiratory effort, and lungs clear to auscultation bilaterally - no rales, rhonchi or wheezes  CV: regular rate and rhythm, normal S1 S2, no S3 or S4 and no murmur, click or rub  PSYCH:  Appearance/Behavior: patient is appropriately and casually dressed.  Speech:  normal  rate, rhythm, and tone.  Mood/Affect: Bright/congruent.  Insight: good   KNEE:  left knee with no effusion, erythema, or warmth.  No tenderness to palpation over the medial joint line.  There is tenderness to palpation over the lateral joint line.  Full range of motion.  No pain with hyperflexion.     Diagnostic Test Results:  Labs reviewed in Epic        Assessment & Plan     1. Acute pain of left knee  I recommended Physical Therapy.  She declined.  I gave her a referral to Sports Medicine in case pain persists.   - SPORTS MEDICINE REFERRAL    2. Essential hypertension with goal blood pressure less than 140/90  stable/well controlled   - losartan (COZAAR) 100 MG tablet; Take 1 tablet (100 mg) by mouth " daily  Dispense: 90 tablet; Refill: 1    3. Anxiety  stable/well controlled   - escitalopram (LEXAPRO) 10 MG tablet; TAKE 1 TABLET BY MOUTH DAILY  Dispense: 90 tablet; Refill: 1  - ALPRAZolam (XANAX) 0.25 MG tablet; Take 1 tablet (0.25 mg) by mouth 3 times daily as needed for anxiety  Dispense: 20 tablet; Refill: 0     Return in about 6 months (around 5/6/2020) for routine Preventive Visit.    Belgica Vogel MD  [facility]

## 2019-11-06 ENCOUNTER — OFFICE VISIT (OUTPATIENT)
Dept: FAMILY MEDICINE | Facility: CLINIC | Age: 44
End: 2019-11-06
Payer: COMMERCIAL

## 2019-11-06 VITALS
HEIGHT: 64 IN | HEART RATE: 77 BPM | SYSTOLIC BLOOD PRESSURE: 118 MMHG | TEMPERATURE: 97.4 F | OXYGEN SATURATION: 98 % | WEIGHT: 159 LBS | RESPIRATION RATE: 14 BRPM | BODY MASS INDEX: 27.14 KG/M2 | DIASTOLIC BLOOD PRESSURE: 80 MMHG

## 2019-11-06 DIAGNOSIS — F41.9 ANXIETY: ICD-10-CM

## 2019-11-06 DIAGNOSIS — M25.562 ACUTE PAIN OF LEFT KNEE: Primary | ICD-10-CM

## 2019-11-06 DIAGNOSIS — I10 ESSENTIAL HYPERTENSION WITH GOAL BLOOD PRESSURE LESS THAN 140/90: ICD-10-CM

## 2019-11-06 PROCEDURE — 99214 OFFICE O/P EST MOD 30 MIN: CPT | Performed by: FAMILY MEDICINE

## 2019-11-06 RX ORDER — ALPRAZOLAM 0.25 MG
0.25 TABLET ORAL 3 TIMES DAILY PRN
Qty: 20 TABLET | Refills: 0 | Status: SHIPPED | OUTPATIENT
Start: 2019-11-06 | End: 2021-04-23

## 2019-11-06 RX ORDER — LOSARTAN POTASSIUM 100 MG/1
100 TABLET ORAL DAILY
Qty: 90 TABLET | Refills: 1 | Status: SHIPPED | OUTPATIENT
Start: 2019-11-06 | End: 2020-04-12

## 2019-11-06 RX ORDER — ESCITALOPRAM OXALATE 10 MG/1
TABLET ORAL
Qty: 90 TABLET | Refills: 1 | Status: SHIPPED | OUTPATIENT
Start: 2019-11-06 | End: 2020-04-12

## 2019-11-06 ASSESSMENT — PATIENT HEALTH QUESTIONNAIRE - PHQ9: 5. POOR APPETITE OR OVEREATING: NOT AT ALL

## 2019-11-06 ASSESSMENT — ANXIETY QUESTIONNAIRES
7. FEELING AFRAID AS IF SOMETHING AWFUL MIGHT HAPPEN: NOT AT ALL
1. FEELING NERVOUS, ANXIOUS, OR ON EDGE: NOT AT ALL
5. BEING SO RESTLESS THAT IT IS HARD TO SIT STILL: NOT AT ALL
GAD7 TOTAL SCORE: 0
2. NOT BEING ABLE TO STOP OR CONTROL WORRYING: NOT AT ALL
6. BECOMING EASILY ANNOYED OR IRRITABLE: NOT AT ALL
3. WORRYING TOO MUCH ABOUT DIFFERENT THINGS: NOT AT ALL

## 2019-11-06 ASSESSMENT — MIFFLIN-ST. JEOR: SCORE: 1365.19

## 2019-11-07 ASSESSMENT — ANXIETY QUESTIONNAIRES: GAD7 TOTAL SCORE: 0

## 2019-11-21 DIAGNOSIS — I10 ESSENTIAL HYPERTENSION WITH GOAL BLOOD PRESSURE LESS THAN 140/90: ICD-10-CM

## 2019-11-21 DIAGNOSIS — F41.9 ANXIETY: ICD-10-CM

## 2019-11-21 NOTE — TELEPHONE ENCOUNTER
"Requested Prescriptions   Pending Prescriptions Disp Refills     escitalopram (LEXAPRO) 10 MG tablet [Pharmacy Med Name: ESCITALOPRAM 10 MG TABLET] 90 tablet 1     Sig: TAKE 1 TABLET BY MOUTH EVERY DAY       SSRIs Protocol Passed - 11/21/2019 12:32 PM        Passed - Recent (12 mo) or future (30 days) visit within the authorizing provider's specialty     Patient has had an office visit with the authorizing provider or a provider within the authorizing providers department within the previous 12 mos or has a future within next 30 days. See \"Patient Info\" tab in inbasket, or \"Choose Columns\" in Meds & Orders section of the refill encounter.              Passed - Medication is active on med list        Passed - Patient is age 18 or older        Passed - No active pregnancy on record        Passed - No positive pregnancy test in last 12 months        losartan (COZAAR) 100 MG tablet [Pharmacy Med Name: LOSARTAN POTASSIUM 100 MG TAB] 90 tablet 1     Sig: TAKE 1 TABLET BY MOUTH EVERY DAY  Last Written Prescription Date:  11/6/2019  Last Fill Quantity: 90 tablet,  # refills: 1   Last Office Visit: 11/6/2019   Future Office Visit:            Angiotensin-II Receptors Passed - 11/21/2019 12:32 PM        Passed - Last blood pressure under 140/90 in past 12 months     BP Readings from Last 3 Encounters:   11/06/19 118/80   04/17/19 132/84   10/01/18 134/89           Passed - Recent (12 mo) or future (30 days) visit within the authorizing provider's specialty     Patient has had an office visit with the authorizing provider or a provider within the authorizing providers department within the previous 12 mos or has a future within next 30 days. See \"Patient Info\" tab in inbasket, or \"Choose Columns\" in Meds & Orders section of the refill encounter.            Passed - Medication is active on med list        Passed - Patient is age 18 or older        Passed - No active pregnancy on record        Passed - Normal serum creatinine on " file in past 12 months     Recent Labs   Lab Test 04/17/19  1140   CR 0.80           Passed - Normal serum potassium on file in past 12 months     Recent Labs   Lab Test 04/17/19  1140   POTASSIUM 3.9            Passed - No positive pregnancy test in past 12 months

## 2019-11-23 ENCOUNTER — MYC MEDICAL ADVICE (OUTPATIENT)
Dept: NURSING | Facility: CLINIC | Age: 44
End: 2019-11-23

## 2019-11-24 NOTE — TELEPHONE ENCOUNTER
[facility] My chart sent to pt to clarify which pharmacy using.  Jaqueline Trinidad RN

## 2019-11-25 NOTE — TELEPHONE ENCOUNTER
Left message on answering machine for patient to call clinic triage.  We need clarification on this med refill request.  ADRIEL Brennan

## 2019-11-29 RX ORDER — ESCITALOPRAM OXALATE 10 MG/1
TABLET ORAL
Qty: 0.1 TABLET | Refills: 0 | OUTPATIENT
Start: 2019-11-29

## 2019-11-29 RX ORDER — LOSARTAN POTASSIUM 100 MG/1
TABLET ORAL
Qty: 0.1 TABLET | Refills: 0 | OUTPATIENT
Start: 2019-11-29

## 2019-11-29 NOTE — TELEPHONE ENCOUNTER
Called patient and Julia Kasper called patient left non detailed message requesting return call to clinic and ask to speak with nurse    Routing refill request to provider for review/approval because:  Unable to reach patient - rx recently sent to mail order pharmacy and now being requested from local pharmacy

## 2019-11-29 NOTE — TELEPHONE ENCOUNTER
Writer notes patient response in TE and mychart    Refused Prescriptions:                       Disp   Refills    escitalopram (LEXAPRO) 10 MG tablet [Pharm*0.1 ta*0        Sig: TAKE 1 TABLET BY MOUTH EVERY DAY  Refused By: KATIE ROMERO  Reason for Refusal: Duplicate    losartan (COZAAR) 100 MG tablet [Pharmacy *0.1 ta*0        Sig: TAKE 1 TABLET BY MOUTH EVERY DAY  Refused By: KATIE ROMERO  Reason for Refusal: Duplicate

## 2019-11-29 NOTE — TELEPHONE ENCOUNTER
Pt returned call today I read to [facility] (LEXAPRO) 10 MG tablet when it was sent vis mail order [facility] and she was calling to get that takien off , She did receive her meds mail order .   Estela Tran  [place]   Patient Rep

## 2020-03-02 ENCOUNTER — HEALTH MAINTENANCE LETTER (OUTPATIENT)
Age: 45
End: 2020-03-02

## 2020-04-11 DIAGNOSIS — F41.9 ANXIETY: ICD-10-CM

## 2020-04-11 DIAGNOSIS — I10 ESSENTIAL HYPERTENSION WITH GOAL BLOOD PRESSURE LESS THAN 140/90: ICD-10-CM

## 2020-04-12 RX ORDER — LOSARTAN POTASSIUM 100 MG/1
TABLET ORAL
Qty: 90 TABLET | Refills: 1 | Status: SHIPPED | OUTPATIENT
Start: 2020-04-12 | End: 2020-09-15

## 2020-04-12 RX ORDER — ESCITALOPRAM OXALATE 10 MG/1
TABLET ORAL
Qty: 90 TABLET | Refills: 1 | Status: SHIPPED | OUTPATIENT
Start: 2020-04-12 | End: 2020-09-15

## 2020-04-12 NOTE — TELEPHONE ENCOUNTER
Prescription approved per [facility] Refill Protocol with considerations for Covid 19 pandemic.  Anabel Gaxiola RN

## 2020-06-01 DIAGNOSIS — F41.9 ANXIETY: ICD-10-CM

## 2020-06-03 RX ORDER — ALPRAZOLAM 0.25 MG
TABLET ORAL
Qty: 20 TABLET | Refills: 0 | OUTPATIENT
Start: 2020-06-03

## 2020-06-03 NOTE — TELEPHONE ENCOUNTER
Routing refill request to provider for review/approval because:  Drug not on the [facility] refill protocol     Last refill: 11/6/2019 @20 no refills    last visit: 11/6/2019   Return in about 6 months (around 5/6/2020)     RECEPTION: please call and set up a virtual visit for a med check.    thanks

## 2020-06-03 NOTE — TELEPHONE ENCOUNTER
LM for patient to call back to schedule & sent [employer] message    Please remove med & sign

## 2020-06-26 ENCOUNTER — OFFICE VISIT (OUTPATIENT)
Dept: URGENT CARE | Facility: URGENT CARE | Age: 45
End: 2020-06-26
Payer: COMMERCIAL

## 2020-06-26 VITALS
BODY MASS INDEX: 25.83 KG/M2 | HEART RATE: 70 BPM | HEIGHT: 65 IN | RESPIRATION RATE: 12 BRPM | SYSTOLIC BLOOD PRESSURE: 128 MMHG | TEMPERATURE: 99.1 F | DIASTOLIC BLOOD PRESSURE: 82 MMHG | WEIGHT: 155 LBS

## 2020-06-26 DIAGNOSIS — R22.2 MASS ON BACK: Primary | ICD-10-CM

## 2020-06-26 PROCEDURE — 99213 OFFICE O/P EST LOW 20 MIN: CPT | Performed by: PHYSICIAN ASSISTANT

## 2020-06-26 ASSESSMENT — MIFFLIN-ST. JEOR: SCORE: 1346.02

## 2020-06-26 NOTE — PROGRESS NOTES
SUBJECTIVE:  Dorie is a 44 year old female who presents to urgent care with concerns about a possible insect in her ear.  She has had on and off again left ear pain and did have a initial spider bite there.  She does not think there is an insect in her ear but wants to make sure.  She denies any tinnitus, hearing loss, vertigo, confusion or significant ear pain.  She also has a mass on the back of her right shoulder that she would like examined.  It is been there for several years it has been growing in size.  Is not necessarily tender or uncomfortable.  She has numerous times try to have massages and use test balls on this but it has persisted.  She denies any fevers, chills, edema, chest pain, nausea, vomiting, URI symptoms and feels otherwise well  Chief Complaint   Patient presents with     Urgent Care     Ear Problem     left ear pain x 2 weeks, possible spider bite.      ROS: See HPI  Current Outpatient Medications   Medication     escitalopram (LEXAPRO) 10 MG tablet     losartan (COZAAR) 100 MG tablet     ALPRAZolam (XANAX) 0.25 MG tablet     polyethylene glycol (MIRALAX) powder     No current facility-administered medications for this visit.       Patient Active Problem List   Diagnosis     Hypertension goal BP (blood pressure) < 140/90     Anxiety     Diffuse photodamage of skin        Past Medical History:   Diagnosis Date     Anxiety     citalopram in the past, minimal xanax use      Hypertension goal BP (blood pressure) < 140/90      IBS (irritable bowel syndrome)      Major depression 11/14/2016     Migraine      Ovarian cyst      Uterine fibroid      Viral hepatitis 4/2013     Past Surgical History:   Procedure Laterality Date     APPENDECTOMY OPEN  1988     ESOPHAGOSCOPY, GASTROSCOPY, DUODENOSCOPY (EGD), COMBINED N/A 7/12/2018    Procedure: COMBINED ESOPHAGOSCOPY, GASTROSCOPY, DUODENOSCOPY (EGD), BIOPSY SINGLE OR MULTIPLE;  gastroscopy;  Surgeon: Gilberto Cee MD;  Location: [facility]      "LAPAROSCOPIC HYSTERECTOMY TOTAL  6/2013    and ovarian cystectomy, ovaries intact     LAPAROTOMY EXPLORATORY  1988    ruptured appendix     Family History   Problem Relation Age of Onset     Hypertension Mother      Hypertension Father      Prostate Cancer Father      Lipids Father      Coronary Artery Disease Father 79        CAD, double bypass     Hypertension Brother      Hypertension Sister      Social History     Tobacco Use     Smoking status: Never Smoker     Smokeless tobacco: Never Used   Substance Use Topics     Alcohol use: Yes     Alcohol/week: 8.0 standard drinks     Types: 8 Glasses of wine per week     Comment: 4-5 drinks a week         OBJECTIVE:  /82   Pulse 70   Temp 99.1  F (37.3  C) (Oral)   Resp 12   Ht 1.638 m (5' 4.5\")   Wt 70.3 kg (155 lb)   LMP 06/11/2013   BMI 26.19 kg/m       GENERAL APPEARANCE: healthy, alert and no distress     EYES: EOMI, -     HENT: ear canals and TM's normal and nose and mouth without ulcers or lesions     MS: extremities normal- no gross deformities noted, no evidence of inflammation in joints, FROM in all extremities.     SKIN: no suspicious lesions or rashes.  Palpable round mass in the right upper back not worse with movement, nontender.  Mobile and firm     NEURO: Normal strength and tone, sensory exam grossly normal, mentation intact and speech normal     PSYCH: mentation appears normal. and affect normal/bright    ASSESSMENT/PLAN:  Dorie was seen today for urgent care and ear problem.    Diagnoses and all orders for this visit:    Mass on back  -     Cancel: US Biopsy Back Soft Tissue Superfical; Future  -     POC US SOFT TISSUE; Future      No abnormality noted on patient's urine exam.  Unclear what the right upper back mass.  It is hard and feels like a muscle spasm but it seems strange that a muscle spasm would be persisting for several years and continue to grow.  Could be a neoplasm, cyst or possible lipoma although it feels different to be a " lipoma.  We will ultrasound to further classify and then biopsy if needed.  The plan of care was discussed with the patient. They understand and agree with the course of treatment prescribed. A printed summary was given including instructions and medications.    Bandar Hdz PA-C

## 2020-07-13 ENCOUNTER — E-VISIT (OUTPATIENT)
Dept: FAMILY MEDICINE | Facility: CLINIC | Age: 45
End: 2020-07-13
Payer: COMMERCIAL

## 2020-07-13 ENCOUNTER — MYC MEDICAL ADVICE (OUTPATIENT)
Dept: FAMILY MEDICINE | Facility: CLINIC | Age: 45
End: 2020-07-13

## 2020-07-13 DIAGNOSIS — Z53.9 ERRONEOUS ENCOUNTER--DISREGARD: Primary | ICD-10-CM

## 2020-07-13 NOTE — TELEPHONE ENCOUNTER
Provider E-Visit time total (minutes): n/a    She should be seen.   I could add her to my schedule on Wed, July 15.  8:40, 9:20, or 10:40.  Otherwise please schedule her with someone else in clinic.

## 2020-07-14 ENCOUNTER — MYC MEDICAL ADVICE (OUTPATIENT)
Dept: FAMILY MEDICINE | Facility: CLINIC | Age: 45
End: 2020-07-14

## 2020-07-14 DIAGNOSIS — R22.2 MASS ON BACK: Primary | ICD-10-CM

## 2020-07-15 NOTE — TELEPHONE ENCOUNTER
"Yes, strangely there is no order for soft tissue US.  The closest I could find is \"chest wall US\" as this is technically on the posterior chest wall.  Please let her know that has been ordered.    Belgica Vogel MD   "

## 2020-07-20 ENCOUNTER — ANCILLARY PROCEDURE (OUTPATIENT)
Dept: ULTRASOUND IMAGING | Facility: CLINIC | Age: 45
End: 2020-07-20
Attending: FAMILY MEDICINE
Payer: COMMERCIAL

## 2020-07-20 DIAGNOSIS — R22.2 MASS ON BACK: ICD-10-CM

## 2020-07-21 ENCOUNTER — MYC MEDICAL ADVICE (OUTPATIENT)
Dept: FAMILY MEDICINE | Facility: CLINIC | Age: 45
End: 2020-07-21

## 2020-07-21 DIAGNOSIS — R22.2 MASS ON BACK: Primary | ICD-10-CM

## 2020-07-21 NOTE — TELEPHONE ENCOUNTER
The treatment is surgical so I am referring her to discuss options with surgery.  I recommend Dr. Ruiz at  of Guillermo Vogel MD

## 2020-07-21 NOTE — RESULT ENCOUNTER NOTE
Larry Oshea,  I'm glad you finally got the ultrasound.  This confirms the lump is in the subcutaneous layer of your skin.  It is most likely a lipoma (benign collection of fat in the subcutaneous layer).  Lipomas are benign and quite common.  The other possibility mentioned (based on location) is fibroelastoma dosi.  This is also a benign lump.  These can be surgically removed but of course there are risks to surgical resection.  Neither lipomas nor fibroelastomas are typically treated surgically unless they are symptomatic (e.g., painful, affecting function) or are growing.  Belgica Vogel MD

## 2020-07-22 ENCOUNTER — PATIENT OUTREACH (OUTPATIENT)
Dept: SURGERY | Facility: CLINIC | Age: 45
End: 2020-07-22

## 2020-07-22 NOTE — PROGRESS NOTES
Referral placed for this patient to consult with General Surgery regarding a back mass.  Appointment (in-person) arranged 8/5 at 10 with Dr. Elias.

## 2020-07-29 ENCOUNTER — MYC MEDICAL ADVICE (OUTPATIENT)
Dept: FAMILY MEDICINE | Facility: CLINIC | Age: 45
End: 2020-07-29

## 2020-07-29 NOTE — LETTER
[address] [address] [phone]          July 30, 2020    RE: Dorie Armendariz                                                                                                                     [address] [place] [phone]            To Whom it May Concern,      Please allow Ms. Armendariz to have a cat in her home as her cat serves as a comfort animal and is beneficial to her mental health.         Sincerely,         Belgica Vogel MD

## 2020-07-30 ENCOUNTER — VIRTUAL VISIT (OUTPATIENT)
Dept: FAMILY MEDICINE | Facility: OTHER | Age: 45
End: 2020-07-30

## 2020-07-30 NOTE — TELEPHONE ENCOUNTER
Dr Vogel,    Please see her note, she is asking for a letter that she needs her cat for anxiety . Trying to buy condo where they are not allowed.    Letter is pended.    Mirian Oseguera RN, BSN

## 2020-07-30 NOTE — PROGRESS NOTES
"Date: 2020 16:51:16  Clinician: Jatinder Mackenzie  Clinician NPI: [id number]  Patient: Dorie Armendariz  Patient : 1975  Patient Address: [address] [place], [place] [id number]  Patient Phone: [phone]  Visit Protocol: URI  Patient Summary:  Dorie is a 44 year old ( : 1975 ) female who initiated a Visit for COVID-19 (Coronavirus) evaluation and screening. When asked the question \"Please sign me up to receive news, health information and promotions. \", Dorie responded \"No\".    Dorie states her symptoms started gradually 3-4 days ago.   Her symptoms consist of myalgia, malaise, and a headache. Dorie also feels feverish.   Symptom details     Temperature: Her current temperature is 99.7 degrees Fahrenheit.     Headache: She states the headache is moderate (4-6 on a 10 point pain scale).      Dorie denies having wheezing, nausea, teeth pain, ageusia, diarrhea, sore throat, anosmia, facial pain or pressure, cough, nasal congestion, vomiting, rhinitis, ear pain, and chills. She also denies having recent facial or sinus surgery in the past 60 days, double sickening (worsening symptoms after initial improvement), taking antibiotic medication in the past month, and having a sinus infection within the past year. She is not experiencing dyspnea.   Precipitating events  She has not recently been exposed to someone with influenza. Dorie has not been in close contact with any high risk individuals.   Pertinent COVID-19 (Coronavirus) information  In the past 14 days, Dorie has not worked in a congregate living setting.   She does not work or volunteer as healthcare worker or a  and does not work or volunteer in a healthcare facility.   Dorie also has not lived in a congregate living setting in the past 14 days. She does not live with a healthcare worker.   Dorie has not had a close contact with a laboratory-confirmed COVID-19 patient within 14 days of symptom onset.   Pertinent medical history  Dorie does not get " yeast infections when she takes antibiotics.   Dorie does not need a return to work/school note.   Weight: 160 lbs   Dorie does not smoke or use smokeless tobacco.   She denies pregnancy and denies breastfeeding. She does not menstruate.   Additional information as reported by the patient (free text): Stomach cramping and pain without diarrhea   Weight: 160 lbs    MEDICATIONS: losartan-hydrochlorothiazide oral, escitalopram oxalate oral, ALLERGIES: Sulfa (Sulfonamide Antibiotics)  Clinician Response:  Dear Dorie,   Your symptoms show that you may have coronavirus (COVID-19). This illness can cause fever, cough and trouble breathing. Many people get a mild case and get better on their own. Some people can get very sick.  What should I do?  We would like to test you for this virus.   1. Please call [phone] to schedule your visit. Explain that you were referred by [facility] to have a COVID-19 test. Be ready to share [facility].  The following will serve as your written order for this COVID Test, ordered by me, for the indication of suspected COVID [Z20.828]: The test will be ordered in [employer], our electronic health record, after you are scheduled. It will show as ordered and authorized by Sloan Keane MD.  Order: COVID-19 (Coronavirus) PCR for SYMPTOMATIC testing from [facility].      2. When it's time for your COVID test:  Stay at least 6 feet away from others. (If someone will drive you to your test, stay in the backseat, as far away from the  as you can.)   Cover your mouth and nose with a mask, tissue or washcloth.  Go straight to the testing site. Don't make any stops on the way there or back.      3.Starting now: Stay home and away from others (self-isolate) until:   You've had no fever---and no medicine that reduces fever---for 3 full days (72 hours). And...   Your other symptoms have gotten better. For example, your cough or breathing has improved. And...   At least 10 days have passed since your  "symptoms started.       During this time, don't leave the house except for testing or medical care.   Stay in your own room, even for meals. Use your own bathroom if you can.   Stay away from others in your home. No hugging, kissing or shaking hands. No visitors.  Don't go to work, school or anywhere else.    Clean \"high touch\" surfaces often (doorknobs, counters, handles, etc.). Use a household cleaning spray or wipes. You'll find a full list of  on the EPA website: www.epa.gov/pesticide-registration/list-n-disinfectants-use-against-sars-cov-2.   Cover your mouth and nose with a mask, tissue or washcloth to avoid spreading germs.  Wash your hands and face often. Use soap and water.  Caregivers in these groups are at risk for severe illness due to COVID-19:  o People 65 years and older  o People who live in a nursing home or long-term care facility  o People with chronic disease (lung, heart, cancer, diabetes, kidney, liver, immunologic)  o People who have a weakened immune system, including those who:   Are in cancer treatment  Take medicine that weakens the immune system, such as corticosteroids  Had a bone marrow or organ transplant  Have an immune deficiency  Have poorly controlled HIV or AIDS  Are obese (body mass index of 40 or higher)  Smoke regularly   o Caregivers should wear gloves while washing dishes, handling laundry and cleaning bedrooms and bathrooms.  o Use caution when washing and drying laundry: Don't shake dirty laundry, and use the warmest water setting that you can.  o For more tips, go to www.cdc.gov/coronavirus/2019-ncov/downloads/10Things.pdf.    4.Sign up for [employer]. We know it's scary to hear that you might have COVID-19. We want to track your symptoms to make sure you're okay over the next 2 weeks. Please look for an email from Ana Rodriguez---this is a free, online program that we'll use to keep in touch. To sign up, follow the link in the email. Learn more at " [email or web address]  How can I take care of myself?   Get lots of rest. Drink extra fluids (unless a doctor has told you not to).   Take Tylenol (acetaminophen) for fever or pain. If you have liver or kidney problems, ask your family doctor if it's okay to take Tylenol.   Adults can take either:    650 mg (two 325 mg pills) every 4 to 6 hours, or...   1,000 mg (two 500 mg pills) every 8 hours as needed.    Note: Don't take more than 3,000 mg in one day. Acetaminophen is found in many medicines (both prescribed and over-the-counter medicines). Read all labels to be sure you don't take too much.   For children, check the Tylenol bottle for the right dose. The dose is based on the child's age or weight.    If you have other health problems (like cancer, heart failure, an organ transplant or severe kidney disease): Call your specialty clinic if you don't feel better in the next 2 days.       Know when to call 911. Emergency warning signs include:    Trouble breathing or shortness of breath Pain or pressure in the chest that doesn't go away Feeling confused like you haven't felt before, or not being able to wake up Bluish-colored lips or face.  Where can I get more information?   [facility] -- About COVID-19: [email or web address]   CDC -- What to Do If You're Sick: www.cdc.gov/coronavirus/2019-ncov/about/steps-when-sick.html   CDC -- Ending Home Isolation: www.cdc.gov/coronavirus/2019-ncov/hcp/disposition-in-home-patients.html   CDC -- Caring for Someone: www.cdc.gov/coronavirus/2019-ncov/if-you-are-sick/care-for-someone.html   [facility] -- Interim Guidance for Hospital Discharge to Home: www.health.[facility].mn.us/diseases/coronavirus/hcp/hospdischarge.pdf   [facility] clinical trials (COVID-19 research studies): clinicalaffairs.[facility]-clinical-trials    Below are the COVID-19 hotlines at the [place] Department of Health ([facility]). Interpreters are available.    [facility]  questions: Call 312-930-3589 or 1-157.886.8541 (7 a.m. to 7 p.m.) For questions about schools and childcare: Call 389-261-5511 or 1-234.723.9150 (7 a.m. to 7 p.m.)    Diagnosis: Other malaise  Diagnosis ICD: R53.81

## 2020-07-31 NOTE — TELEPHONE ENCOUNTER
Letter is done and in Ainsley LEE inbox (in east physician room at [facility]).  Belgica Vogel MD

## 2020-08-07 ENCOUNTER — HOSPITAL ENCOUNTER (EMERGENCY)
Facility: CLINIC | Age: 45
Discharge: HOME | End: 2020-08-07
Attending: EMERGENCY MEDICINE | Admitting: EMERGENCY MEDICINE
Payer: COMMERCIAL

## 2020-08-07 ENCOUNTER — E-VISIT (OUTPATIENT)
Dept: FAMILY MEDICINE | Facility: CLINIC | Age: 45
End: 2020-08-07
Payer: COMMERCIAL

## 2020-08-07 VITALS
OXYGEN SATURATION: 97 % | BODY MASS INDEX: 26.98 KG/M2 | HEART RATE: 86 BPM | TEMPERATURE: 98.6 F | DIASTOLIC BLOOD PRESSURE: 88 MMHG | SYSTOLIC BLOOD PRESSURE: 130 MMHG | HEIGHT: 64 IN | RESPIRATION RATE: 16 BRPM | WEIGHT: 158 LBS

## 2020-08-07 DIAGNOSIS — R19.7 DIARRHEA, UNSPECIFIED TYPE: Primary | ICD-10-CM

## 2020-08-07 DIAGNOSIS — R19.7 DIARRHEA, UNSPECIFIED TYPE: ICD-10-CM

## 2020-08-07 LAB
ALBUMIN SERPL-MCNC: 3.8 G/DL (ref 3.4–5)
ALP SERPL-CCNC: 47 U/L (ref 40–150)
ALT SERPL W P-5'-P-CCNC: 26 U/L (ref 0–50)
ANION GAP SERPL CALCULATED.3IONS-SCNC: 3 MMOL/L (ref 3–14)
AST SERPL W P-5'-P-CCNC: 17 U/L (ref 0–45)
BILIRUB SERPL-MCNC: 0.4 MG/DL (ref 0.2–1.3)
BUN SERPL-MCNC: 11 MG/DL (ref 7–30)
CALCIUM SERPL-MCNC: 8.8 MG/DL (ref 8.5–10.1)
CHLORIDE SERPL-SCNC: 109 MMOL/L (ref 94–109)
CO2 SERPL-SCNC: 26 MMOL/L (ref 20–32)
CREAT SERPL-MCNC: 0.73 MG/DL (ref 0.52–1.04)
GFR SERPL CREATININE-BSD FRML MDRD: >90 ML/MIN/{1.73_M2}
GLUCOSE SERPL-MCNC: 90 MG/DL (ref 70–99)
POTASSIUM SERPL-SCNC: 3.7 MMOL/L (ref 3.4–5.3)
PROT SERPL-MCNC: 7.3 G/DL (ref 6.8–8.8)
SODIUM SERPL-SCNC: 138 MMOL/L (ref 133–144)

## 2020-08-07 PROCEDURE — 25800030 ZZH RX IP 258 OP 636: Performed by: EMERGENCY MEDICINE

## 2020-08-07 PROCEDURE — 96360 HYDRATION IV INFUSION INIT: CPT

## 2020-08-07 PROCEDURE — 99422 OL DIG E/M SVC 11-20 MIN: CPT | Performed by: FAMILY MEDICINE

## 2020-08-07 PROCEDURE — 80053 COMPREHEN METABOLIC PANEL: CPT | Performed by: EMERGENCY MEDICINE

## 2020-08-07 PROCEDURE — 99283 EMERGENCY DEPT VISIT LOW MDM: CPT | Mod: 25

## 2020-08-07 PROCEDURE — 96361 HYDRATE IV INFUSION ADD-ON: CPT

## 2020-08-07 RX ADMIN — SODIUM CHLORIDE 1000 ML: 9 INJECTION, SOLUTION INTRAVENOUS at 10:59

## 2020-08-07 ASSESSMENT — ENCOUNTER SYMPTOMS
ABDOMINAL PAIN: 1
FREQUENCY: 0
SHORTNESS OF BREATH: 0
DIARRHEA: 1
FATIGUE: 1
NAUSEA: 1
DIFFICULTY URINATING: 0
APPETITE CHANGE: 1
FEVER: 0
SORE THROAT: 0
HEMATURIA: 0
DYSURIA: 0

## 2020-08-07 ASSESSMENT — MIFFLIN-ST. JEOR: SCORE: 1351.68

## 2020-08-07 NOTE — ED NOTES
Bed: ED04  Expected date: 8/7/20  Expected time: 10:07 AM  Means of arrival:   Comments:  Triage

## 2020-08-07 NOTE — ED PROVIDER NOTES
"History     Chief Complaint:  Diarrhea and Fatigue     The history is provided by the patient.     Dorie Armendariz is a 44 year old year old female with a history of hypertension who presents for evaluation of diarrhea and fatigue. About 1.5 weeks ago, the patient developed abdominal cramping as well as diarrhea and increased fatigue. 8 days ago, the patient had a low grade fever though not above 100.0F. 7 days ago, the patient was tested for COVID but her results came back negative. Since then, the patient states that she felt better but continued to be fatigued, very tired, and did not have an appetite. She reports nausea and feels like \"food comes back up\" after she eats it. Her diarrhea also came back with about 3-4 episodes per day.     This morning, the patient states that her diarrhea was gray, which prompted her arrival to the ED today.     Patient denies any recent fever, chest pain, shortness of breath, leg swelling, sore throat, urinary symptoms, ill contacts, chance of pregnancy, or recent antibiotic use.    Allergies:  Sulfa Drugs    Medications:   Xanax  Lexapro  Losartan     Medical History:   Anxiety  Hypertension  IBS  Depression  Migraines  Ovarian cyst  Uterine fibroid     Surgical History   Appendectomy  EGD, combined  Laparoscopic hysterectomy and ovarian cystectomy    Family History:   Hypertension  Lipids  Cancer  CAD: Father     Social History:  The patient was accompanied to the ED by herself.  Smoking Status: Never Smoker  Smokeless Tobacco: Never Used  Alcohol Use: Positive  PCP: Belgica Vogel     Review of Systems   Constitutional: Positive for appetite change and fatigue. Negative for fever.   HENT: Negative for sore throat.    Respiratory: Negative for shortness of breath.    Cardiovascular: Negative for chest pain and leg swelling.   Gastrointestinal: Positive for abdominal pain, diarrhea and nausea.   Genitourinary: Negative for difficulty urinating, dysuria, frequency, hematuria and " "urgency.   All other systems reviewed and are negative.      Physical Exam     Patient Vitals for the past 24 hrs:   BP Temp Temp src Pulse Resp SpO2 Height Weight   08/07/20 1023 (!) 132/94 98.6  F (37  C) Oral 86 20 100 % 1.626 m (5' 4\") 71.7 kg (158 lb)       Physical Exam  General: Alert and cooperative with exam. Patient in mild distress. Normal mentation.  Head:  Scalp is NC/AT  Eyes:  No scleral icterus, PERRL  ENT:  The external nose and ears are normal. The oropharynx is normal and without erythema; mucus membranes are moist. Uvula midline, no evidence of deep space infection.  Neck:  Normal range of motion without rigidity.  CV:  Regular rate and rhythm    No pathologic murmur   Resp:  Breath sounds are clear bilaterally    Non-labored, no retractions or accessory muscle use  GI:  Abdomen is soft, no distension, no tenderness. No peritoneal signs  MS:  No lower extremity edema   Skin:  Warm and dry, No rash or lesions noted.  Neuro: Oriented x 3. No gross motor deficits.    Emergency Department Course   Laboratory:  Laboratory findings were communicated with the patient who voiced understanding of the findings.    CMP: WNL (Creatinine: 0.73)    Interventions:  1059 NS 1L IV    Emergency Department Course:  Past medical records, nursing notes, and vitals reviewed.    10:16 AM I performed an exam of the patient as documented above.     IV was inserted and blood was drawn for laboratory testing, results above.    1144 I rechecked the patient and discussed the results of her workup thus far.     Findings and plan explained to the Patient. Patient discharged home with instructions regarding supportive care, medications, and reasons to return. The importance of close follow-up was reviewed.    I personally reviewed the laboratory results with the Patient and answered all related questions prior to discharge.     Impression & Plan   Medical Decision Making:  Dorie Armendariz is a 44 year old female who presents for " evaluation of diarrhea. I considered a broad differential of course; the differential diagnosis of diarrhea is broad and includes such etiologies as viral gastroenteritis, traveler's diarrhea, giardia, colitis, GI bleed, bacterial infection of the large intestine (salmonella, shigella, campylobacter, e coli, etc), parasite, etc.  There are no signs of worrisome intra-abdominal pathologies detected during the visit today and no blood per patient report.  The patient has a benign abdominal exam without rebound, guarding, or marked tenderness to palpation.  Supportive outpatient management is therefore indicated.  Abdominal pain precautions are given for home.   No indication for stool studies at this time.  No indication for CT at this time.  It was discussed with the patient to return to the ED for blood in stool, increasing pain, or fevers.      Diagnosis:    ICD-10-CM    1. Diarrhea, unspecified type  R19.7        Disposition:  Discharged to home.    Scribe Disclosure:  Brianna TERRELL, am serving as a scribe at 10:16 AM on 8/7/2020 to document services personally performed by Anil Antony DO, based on my observations and the provider's statements to me.      Anil Antony DO  08/07/20 [id number]

## 2020-08-07 NOTE — ED AVS SNAPSHOT
Emergency Department  [address] [place] [address]  Phone:  [phone]  Fax:  [phone]                                    Dorie McCartan   MRN: [id number]    Department:   Emergency Department   Date of Visit:  8/7/2020           After Visit Summary Signature Page    I have received my discharge instructions, and my questions have been answered. I have discussed any challenges I see with this plan with the nurse or doctor.    ..........................................................................................................................................  Patient/Patient Representative Signature      ..........................................................................................................................................  Patient Representative Print Name and Relationship to Patient    ..................................................               ................................................  Date                                   Time    ..........................................................................................................................................  Reviewed by Signature/Title    ...................................................              ..............................................  Date                                               Time          22EPIC Rev 08/18

## 2020-08-07 NOTE — DISCHARGE INSTRUCTIONS
Return to the emergency department or seek medical care as instructed if your symptoms fail to improve or significantly worsen.    Take Acetaminophen (aka Tylenol) and/or ibuprofen (aka Motrin/Advil, 600mg up to 4 times per day with food) as needed for symptom/pain relief; use as directed.    Take Imodium as needed for diarrhea    Follow-up as indicated on page 1.  Maintain adequate hydration and get plenty of rest.

## 2020-12-20 ENCOUNTER — HEALTH MAINTENANCE LETTER (OUTPATIENT)
Age: 45
End: 2020-12-20

## 2021-02-28 ENCOUNTER — HEALTH MAINTENANCE LETTER (OUTPATIENT)
Age: 46
End: 2021-02-28

## 2021-04-22 DIAGNOSIS — F41.9 ANXIETY: ICD-10-CM

## 2021-04-22 NOTE — TELEPHONE ENCOUNTER
Last Written Prescription Date:  11/6/19  Last Fill Quantity: 20,   # refills: 0  Last Office Visit: 8/7/20  Future Office visit:       Routing refill request to provider for review/approval because:  Drug not on the [facility] or [facility] refill protocol or controlled substance    Dorie Armendariz Angela I, MD          4/22/21 12:25 PM  [facility] Dr. Vogel,     You're going to receive a request to refill my prescriptions from [facility]. I know I haven't been seen in over a year. I have moved to [place] and have not yet established care here. However, I finally called to do so, and I can't get a new patient appointment until July 14. I have an appointment 7/14 at 9:00 am with Dr Mray Pineda [phone]. I am hoping you will please ok one last refill so I can make it until that appointment. Thank you for being a great doctor for me during my time in [place].  Dorie

## 2021-04-23 RX ORDER — ALPRAZOLAM 0.25 MG
TABLET ORAL
Qty: 20 TABLET | Refills: 0 | Status: SHIPPED | OUTPATIENT
Start: 2021-04-23

## 2021-04-24 ENCOUNTER — HEALTH MAINTENANCE LETTER (OUTPATIENT)
Age: 46
End: 2021-04-24

## 2021-10-03 ENCOUNTER — HEALTH MAINTENANCE LETTER (OUTPATIENT)
Age: 46
End: 2021-10-03

## 2022-03-20 ENCOUNTER — HEALTH MAINTENANCE LETTER (OUTPATIENT)
Age: 47
End: 2022-03-20

## 2022-05-15 ENCOUNTER — HEALTH MAINTENANCE LETTER (OUTPATIENT)
Age: 47
End: 2022-05-15

## 2022-09-10 ENCOUNTER — HEALTH MAINTENANCE LETTER (OUTPATIENT)
Age: 47
End: 2022-09-10

## 2023-04-30 ENCOUNTER — HEALTH MAINTENANCE LETTER (OUTPATIENT)
Age: 48
End: 2023-04-30

## 2023-06-03 ENCOUNTER — HEALTH MAINTENANCE LETTER (OUTPATIENT)
Age: 48
End: 2023-06-03

## (undated) RX ORDER — FENTANYL CITRATE 50 UG/ML
INJECTION, SOLUTION INTRAMUSCULAR; INTRAVENOUS
Status: DISPENSED
Start: 2018-07-12